# Patient Record
Sex: FEMALE | Race: WHITE | NOT HISPANIC OR LATINO | Employment: OTHER | URBAN - METROPOLITAN AREA
[De-identification: names, ages, dates, MRNs, and addresses within clinical notes are randomized per-mention and may not be internally consistent; named-entity substitution may affect disease eponyms.]

---

## 2017-04-28 ENCOUNTER — TELEPHONE (OUTPATIENT)
Dept: OBSTETRICS AND GYNECOLOGY | Age: 82
End: 2017-04-28

## 2017-04-28 NOTE — TELEPHONE ENCOUNTER
"Called pt, she notes symptoms are not vaginal but on external skin, she tried monistat without help. Recommend she make appt to evaluate. She reports \"little bumps\". Symptoms come and go for the last 2 years. Last visit was 2015. Recommend she come in for evaluation. Patient denies fever or severe issues, she will call back Monday to schedule.  "

## 2017-05-11 ENCOUNTER — OFFICE VISIT (OUTPATIENT)
Dept: OBSTETRICS AND GYNECOLOGY | Age: 82
End: 2017-05-11

## 2017-05-11 VITALS
HEIGHT: 69 IN | WEIGHT: 164 LBS | SYSTOLIC BLOOD PRESSURE: 122 MMHG | DIASTOLIC BLOOD PRESSURE: 74 MMHG | BODY MASS INDEX: 24.29 KG/M2

## 2017-05-11 DIAGNOSIS — B37.2 YEAST DERMATITIS: Primary | ICD-10-CM

## 2017-05-11 PROCEDURE — 99212 OFFICE O/P EST SF 10 MIN: CPT | Performed by: OBSTETRICS & GYNECOLOGY

## 2017-05-11 RX ORDER — LEVOTHYROXINE SODIUM 0.05 MG/1
50 TABLET ORAL DAILY
COMMUNITY
End: 2021-05-27

## 2017-05-11 RX ORDER — METOPROLOL TARTRATE 50 MG/1
50 TABLET, FILM COATED ORAL 2 TIMES DAILY
COMMUNITY
End: 2019-08-06

## 2017-05-11 RX ORDER — NYSTATIN 100000 U/G
CREAM TOPICAL 2 TIMES DAILY
Qty: 15 G | Refills: 1 | Status: SHIPPED | OUTPATIENT
Start: 2017-05-11 | End: 2021-05-27

## 2017-05-11 RX ORDER — NYSTATIN 100000 [USP'U]/G
POWDER TOPICAL 2 TIMES DAILY
Qty: 1 EACH | Refills: 1 | Status: SHIPPED | OUTPATIENT
Start: 2017-05-11 | End: 2017-05-11

## 2017-05-11 RX ORDER — LIOTHYRONINE SODIUM 5 UG/1
10 TABLET ORAL EVERY MORNING
COMMUNITY
End: 2021-12-02 | Stop reason: SDUPTHER

## 2019-08-06 ENCOUNTER — OFFICE VISIT (OUTPATIENT)
Dept: OBSTETRICS AND GYNECOLOGY | Age: 84
End: 2019-08-06

## 2019-08-06 VITALS
WEIGHT: 165.6 LBS | HEIGHT: 69 IN | BODY MASS INDEX: 24.53 KG/M2 | DIASTOLIC BLOOD PRESSURE: 72 MMHG | SYSTOLIC BLOOD PRESSURE: 110 MMHG

## 2019-08-06 DIAGNOSIS — Z01.419 ENCOUNTER FOR GYNECOLOGICAL EXAMINATION: Primary | ICD-10-CM

## 2019-08-06 PROCEDURE — G0101 CA SCREEN;PELVIC/BREAST EXAM: HCPCS | Performed by: OBSTETRICS & GYNECOLOGY

## 2019-08-06 RX ORDER — METOPROLOL SUCCINATE 50 MG/1
50 TABLET, EXTENDED RELEASE ORAL NIGHTLY
COMMUNITY
Start: 2019-04-22

## 2019-08-06 RX ORDER — AZELASTINE 1 MG/ML
1 SPRAY, METERED NASAL 2 TIMES DAILY
Refills: 5 | COMMUNITY
Start: 2019-05-21

## 2019-08-06 NOTE — PROGRESS NOTES
"Subjective     Chief Complaint   Patient presents with   • Gynecologic Exam     AE  HX MG 3/23/06       History of Present Illness    Madina Coughlin is a 84 y.o.  who presents for annual exam.  Pt denies vaginal bleeding or abnormal discharge; she is living at [place]    Obstetric History:  OB History      Para Term  AB Living    5 4 4   1 3    SAB TAB Ectopic Molar Multiple Live Births    1         3         Menstrual History:     No LMP recorded. Patient is postmenopausal.         Current contraception: post menopausal status  History of abnormal Pap smear: no  Received Gardasil immunization: no  Perform regular self breast exam: yes - reg  Family history of uterine or ovarian cancer: no  Family History of colon cancer: no  Family history of breast cancer: yes - Aunt after menopause    Mammogram: recommended  Colonoscopy: up to date.  DEXA: up to date, her endocrine manages; pt declines treatment    Exercise: moderately active  Calcium/Vitamin D: adequate intake    The following portions of the patient's history were reviewed and updated as appropriate: allergies, current medications, past family history, past medical history, past social history, past surgical history and problem list.    Review of Systems    Review of Systems   Constitutional: Negative for fatigue.   Respiratory: Negative for shortness of breath.    Gastrointestinal: Negative for abdominal pain except diet related/ pt has IBS. neg for blood in stool  Genitourinary: Negative for dysuria. neg for blood in stool  Neurological: Negative for headaches.   Psychiatric/Behavioral: Negative for dysphoric mood.         Objective   Physical Exam    /72   Ht 175.3 cm (69\")   Wt 75.1 kg (165 lb 9.6 oz)   Breastfeeding? No   BMI 24.45 kg/m²     General:   alert, appears stated age, cooperative and no distress   Neck: no asymmetry, masses, or scars and thyroid normal to palpation   Heart: regular rate and rhythm, S1, S2 " normal, no murmur, click, rub or gallop   Lungs: clear to auscultation bilaterally   Abdomen: soft, non-tender, without masses or organomegaly   Breast: inspection negative, no nipple discharge or bleeding, no masses or nodularity palpable and no axillary adenopathy   Vulva: Bartholin's, Urethra, [place]'s normal, atrophy noted with some irritation of skin at introitus with placement of speculum, no abnormal lesions noted   Vagina: normal discharge, atrophic without abnormal lesions   Cervix: no lesions   Uterus: normal size, mobile, non-tender, normal shape and consistency   Adnexa: normal adnexa and no mass, fullness, tenderness   Rectal: normal rectal, no masses     Assessment/Plan   Madina was seen today for gynecologic exam.    Diagnoses and all orders for this visit:    Encounter for gynecological examination        All questions answered.  Breast self exam technique reviewed and patient encouraged to perform self-exam monthly.  Discussed healthy lifestyle modifications.  Recommended 30 minutes of aerobic exercise five times per week.  Discussed calcium/ Vit D needs to prevent osteoporosis.  Pap deferred as pt > 64 yo and no hx of abnormal paps  Recommend mammogram. Pt currently declines    Pt has estrogen cream that her primary MD gave her. Discussed that this is appropriate to use sparingly on affected skin and pt reports she only applies focally and uses rarely.

## 2020-01-20 ENCOUNTER — HOSPITAL ENCOUNTER (EMERGENCY)
Facility: HOSPITAL | Age: 85
Discharge: HOME | End: 2020-01-21
Attending: EMERGENCY MEDICINE | Admitting: EMERGENCY MEDICINE

## 2020-01-20 DIAGNOSIS — T50.901A ACCIDENTAL MEDICATION OVERDOSE, INITIAL ENCOUNTER: Primary | ICD-10-CM

## 2020-01-20 PROCEDURE — 99282 EMERGENCY DEPT VISIT SF MDM: CPT

## 2020-01-21 VITALS
BODY MASS INDEX: 25.5 KG/M2 | DIASTOLIC BLOOD PRESSURE: 84 MMHG | HEART RATE: 50 BPM | OXYGEN SATURATION: 97 % | WEIGHT: 172.18 LBS | SYSTOLIC BLOOD PRESSURE: 152 MMHG | RESPIRATION RATE: 16 BRPM | TEMPERATURE: 97.4 F | HEIGHT: 69 IN

## 2020-01-21 NOTE — ED PROVIDER NOTES
EMERGENCY DEPARTMENT ENCOUNTER    CHIEF COMPLAINT  Chief Complaint: Drug Overdose   History given by: Patient   History limited by: none   Room Number: 16/16  PMD: Tara Russell MD      HPI:  Pt is a 84 y.o. female who presents complaining of accidental drug overdose prior to arrival at ED tonight. Per pt, at 2000 tonight she took her metoprolol dosage and states she forgot she had taken it so took a second dosage at 2100. Pt denies any symptoms including chest pain, SOA, palpitations, nausea, vomiting, diarrhea, or self injury intent. Per pt, she presented to [facility] and was told to present to ED for evaluation. Pt states she takes Metoprolol for hx of SVT.      Duration:  3 hours   Location: generalized   Quality: accidental drug overdose   Intensity/Severity: mild   Progression: unchanged   Associated Symptoms: none   Aggravating Factors: none   Alleviating Factors: none   Previous Episodes: none   Treatment before arrival: none     PAST MEDICAL HISTORY  Active Ambulatory Problems     Diagnosis Date Noted   • HTN (hypertension) 05/01/2013   • Hypothyroid 05/01/2013   • IBS (irritable bowel syndrome) 05/01/2013   • SVT (supraventricular tachycardia) (CMS/HCC) 05/01/2013     Resolved Ambulatory Problems     Diagnosis Date Noted   • No Resolved Ambulatory Problems     Past Medical History:   Diagnosis Date   • Abnormal heart rate    • Disease of thyroid gland        PAST SURGICAL HISTORY  History reviewed. No pertinent surgical history.    FAMILY HISTORY  Family History   Problem Relation Age of Onset   • Coronary artery disease Paternal Grandfather    • Coronary artery disease Maternal Grandfather    • Breast cancer Maternal Aunt    • Ovarian cancer Neg Hx    • Uterine cancer Neg Hx    • Colon cancer Neg Hx        SOCIAL HISTORY  Social History     Socioeconomic History   • Marital status:      Spouse name: Not on file   • Number of children: Not on file   • Years of education: Not on file   • Highest  education level: Not on file   Tobacco Use   • Smoking status: Never Smoker   • Smokeless tobacco: Never Used   Substance and Sexual Activity   • Sexual activity: Not Currently       ALLERGIES  Patient has no known allergies.    REVIEW OF SYSTEMS  Review of Systems   Respiratory: Negative for shortness of breath.    Cardiovascular: Negative for chest pain and palpitations.   Gastrointestinal: Negative for diarrhea, nausea and vomiting.   Psychiatric/Behavioral: Negative for self-injury.       PHYSICAL EXAM  ED Triage Vitals   Temp Heart Rate Resp BP SpO2   01/20/20 2252 01/20/20 2252 01/20/20 2252 01/20/20 2258 01/20/20 2252   97.4 °F (36.3 °C) 67 16 173/97 98 %      Temp src Heart Rate Source Patient Position BP Location FiO2 (%)   -- -- 01/20/20 2258 01/20/20 2258 --     Sitting Left arm        Physical Exam   Constitutional: She is oriented to person, place, and time. No distress.   Eyes: EOM are normal.   Neck: Normal range of motion.   Cardiovascular: Regular rhythm.   Mildly bradycardic.    Pulmonary/Chest: Effort normal and breath sounds normal. No respiratory distress.   Neurological: She is alert and oriented to person, place, and time. She has normal sensation and normal strength.   Skin: Skin is warm and dry.   Psychiatric: Affect normal.   Nursing note and vitals reviewed.    Procedures      PROGRESS AND CONSULTS     2359: Upon pt exam, discussed pt taking one extra dosage of medication as not being a concern at this time, especially due to event occurring 3 hours ago. Discussed pt's normal vitals upon arrival and plan for recheck in room with some monitoring prior to disposition. Pt understands and agrees with the plan, all questions answered.    0059: Pt's HR is 52 and BP is stable, pt will be discharged as prior discussed.     MEDICAL DECISION MAKING  Results were reviewed/discussed with the patient and they were also made aware of online access. Pt also made aware that some labs, such as cultures,  will not be resulted during ER visit and follow up with PMD is necessary.     MDM       DIAGNOSIS  Final diagnoses:   Accidental medication overdose, initial encounter       DISPOSITION  DISCHARGE    Patient discharged in stable condition.    Reviewed implications of results, diagnosis, meds, responsibility to follow up, warning signs and symptoms of possible worsening, potential complications and reasons to return to ER.    Patient/Family voiced understanding of above instructions.    Discussed plan for discharge, as there is no emergent indication for admission. Patient referred to primary care provider for BP management due to today's BP. Pt/family is agreeable and understands need for follow up and repeat testing.  Pt is aware that discharge does not mean that nothing is wrong but it indicates no emergency is present that requires admission and they must continue care with follow-up as given below or physician of their choice.     FOLLOW-UP  Tara Russell MD  [address] [place] [address]  [address]  [facility] [address] [phone]    Schedule an appointment as soon as possible for a visit            Medication List      No changes were made to your prescriptions during this visit.           Latest Documented Vital Signs:  As of 2:22 AM  BP- 152/84 HR- 50 Temp- 97.4 °F (36.3 °C) O2 sat- 97%    --  Documentation assistance provided by alondra Hall for Dr. Cordon.  Information recorded by the scribe was done at my direction and has been verified and validated by me.     Reva Hall  01/21/20 0126       Mateo Cordon MD  01/21/20 2444

## 2020-11-09 ENCOUNTER — TELEPHONE (OUTPATIENT)
Dept: OBSTETRICS AND GYNECOLOGY | Age: 85
End: 2020-11-09

## 2020-11-09 NOTE — TELEPHONE ENCOUNTER
Patient is experiencing redness and itching in her vulva area. Stated a dermatologist prescribed a fungal medication which helped a little but not clearing up.   Patient would like to know if she can come in and be seen or even do a televisit.  Please advise.

## 2020-11-10 ENCOUNTER — OFFICE VISIT (OUTPATIENT)
Dept: OBSTETRICS AND GYNECOLOGY | Age: 85
End: 2020-11-10

## 2020-11-10 VITALS
WEIGHT: 165.8 LBS | HEIGHT: 69 IN | BODY MASS INDEX: 24.56 KG/M2 | DIASTOLIC BLOOD PRESSURE: 60 MMHG | SYSTOLIC BLOOD PRESSURE: 100 MMHG

## 2020-11-10 DIAGNOSIS — N90.89 VULVAR IRRITATION: Primary | ICD-10-CM

## 2020-11-10 PROCEDURE — 99213 OFFICE O/P EST LOW 20 MIN: CPT | Performed by: OBSTETRICS & GYNECOLOGY

## 2020-11-10 NOTE — PROGRESS NOTES
"Chief complaint:groin/ vulvar irritation    HPI  Madina Coughlin is a 85 y.o. female presents for evaluation of groin/vulvar irritation. She reports she has had this off/on for several years. She had this evaluated by her dermatologist. He gave her a cream that helped with her groin irritation and itching but then she noted some redness  medially. She denies any vaginal bleeding or pelvic pain. She reports mild itching over area of erythema medially but notes most itching/irritation was in the groin area and improved.         The following portions of the patient's history were reviewed and updated as appropriate: allergies, current medications, past family history, past medical history, past social history, past surgical history and problem list.    Review of Systems  Constitutional: negative for fevers  Respiratory: negative for cough  Genitourinary:positive for groin and vulvar itching/redness, negative for vaginal bleeding  Integument/breast: positive for skin redness groin/vulva    /60   Ht 175.3 cm (69\")   Wt 75.2 kg (165 lb 12.8 oz)   LMP  (LMP Unknown)   Breastfeeding No   BMI 24.48 kg/m²         Physical Exam  Constitutional:       Appearance: Normal appearance.   Genitourinary:     Comments: Bilateral groin areas without rash/erythema or focal lesions. Mild symmetric erythema noted on both labia majora. No focal lesions or discharge noted. Remainder of vulva normal. Introitus and vagina are atrophic but appear normal.   Neurological:      General: No focal deficit present.      Mental Status: She is alert and oriented to person, place, and time.   Psychiatric:         Mood and Affect: Mood normal.         Behavior: Behavior normal.             Diagnoses and all orders for this visit:    1. Vulvar irritation (Primary)      Groin irritation was likely intertriginous yeast and is improved following antifungal/steroid cream. Pt notes less symptoms medially but she noted some erythema there. Site of " erythema corresponds to the area where her labia rest together in the midline. Recommend barrier cream and loose, cotton undergarments. Pt agrees with this plan and findings. Will f/u as needed or for annual next year.

## 2020-11-20 ENCOUNTER — TELEPHONE (OUTPATIENT)
Dept: OBSTETRICS AND GYNECOLOGY | Age: 85
End: 2020-11-20

## 2020-11-20 NOTE — TELEPHONE ENCOUNTER
(fyi only) Patient called, pt had seen you about two weeks ago and she wanted to thank you and let you know the problem has been solved.

## 2021-02-18 ENCOUNTER — OFFICE VISIT (OUTPATIENT)
Dept: ORTHOPEDIC SURGERY | Facility: CLINIC | Age: 86
End: 2021-02-18

## 2021-02-18 VITALS — BODY MASS INDEX: 24.25 KG/M2 | TEMPERATURE: 96 F | WEIGHT: 160 LBS | HEIGHT: 68 IN

## 2021-02-18 DIAGNOSIS — M17.12 PRIMARY OSTEOARTHRITIS OF LEFT KNEE: ICD-10-CM

## 2021-02-18 DIAGNOSIS — M25.562 LEFT KNEE PAIN, UNSPECIFIED CHRONICITY: Primary | ICD-10-CM

## 2021-02-18 PROCEDURE — 99203 OFFICE O/P NEW LOW 30 MIN: CPT | Performed by: NURSE PRACTITIONER

## 2021-02-18 PROCEDURE — 20610 DRAIN/INJ JOINT/BURSA W/O US: CPT | Performed by: NURSE PRACTITIONER

## 2021-02-18 PROCEDURE — 73562 X-RAY EXAM OF KNEE 3: CPT | Performed by: NURSE PRACTITIONER

## 2021-02-18 RX ORDER — DIPHENOXYLATE HYDROCHLORIDE AND ATROPINE SULFATE 2.5; .025 MG/1; MG/1
1 TABLET ORAL DAILY
COMMUNITY
End: 2022-10-25

## 2021-02-18 RX ORDER — METHYLPREDNISOLONE ACETATE 80 MG/ML
80 INJECTION, SUSPENSION INTRA-ARTICULAR; INTRALESIONAL; INTRAMUSCULAR; SOFT TISSUE
Status: COMPLETED | OUTPATIENT
Start: 2021-02-18 | End: 2021-02-18

## 2021-02-18 RX ADMIN — METHYLPREDNISOLONE ACETATE 80 MG: 80 INJECTION, SUSPENSION INTRA-ARTICULAR; INTRALESIONAL; INTRAMUSCULAR; SOFT TISSUE at 15:52

## 2021-02-18 NOTE — PROGRESS NOTES
"Patient Name: Madina Coughlin   YOB: 1935  Referring Primary Care Physician: Tara Russell MD  BMI: Body mass index is 24.33 kg/m².    Chief Complaint:    Chief Complaint   Patient presents with   • Left Knee - Pain        HPI: New pt to the clinic that presents with left knee pain and \"give way sensation\".  She resides at the [place] [facility] and has to walk up a flight of stairs to live on the second floor and has felt insecure doing this.  She started walking with a walker about 2 weeks ago and presents here for evaluation and treatment.  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  Takes Tylenol arthritis and very limited Advil past medical history is reviewed and she is very healthy for her age and active.    Madina Coughlin is a 85 y.o. female who presents today for evaluation of   Chief Complaint   Patient presents with   • Left Knee - Pain         Subjective   Medications:   Home Medications:  Current Outpatient Medications on File Prior to Visit   Medication Sig   • Cetirizine HCl 10 MG capsule Take  by mouth.   • Digestive Enzymes (DIGESTIVE ENZYME PO) Take  by mouth.   • levothyroxine (SYNTHROID, LEVOTHROID) 50 MCG tablet Take 50 mcg by mouth Daily.   • liothyronine (CYTOMEL) 5 MCG tablet Take 5 mcg by mouth Daily.   • metoprolol succinate XL (TOPROL-XL) 50 MG 24 hr tablet TAKE 1 TABLET EVERY DAY   • multivitamin (MULTI-VITAMIN PO) Take  by mouth Daily.   • Probiotic Product (PROBIOTIC PO) Take  by mouth.   • azelastine (ASTELIN) 0.1 % nasal spray USE 1 TO 2 PUFFS IN EACH NOSTRIL AT BEDTIME   • nystatin (MYCOSTATIN) 174198 UNIT/GM cream Apply  topically 2 (Two) Times a Day. to affected area as needed     No current facility-administered medications on file prior to visit.      Current Medications:  Scheduled Meds:  Continuous Infusions:No current facility-administered medications for this visit.     PRN " Meds:.    I have reviewed the patient's medical history in detail and updated the computerized patient record.  Review and summarization of old records includes:    Past Medical History:   Diagnosis Date   • Abnormal heart rate    • Disease of thyroid gland       History reviewed. No pertinent surgical history.     Social History     Occupational History   • Not on file   Tobacco Use   • Smoking status: Never Smoker   • Smokeless tobacco: Never Used   Substance and Sexual Activity   • Alcohol use: Not on file   • Drug use: Not on file   • Sexual activity: Not Currently      Social History     Social History Narrative   • Not on file        Family History   Problem Relation Age of Onset   • Coronary artery disease Paternal Grandfather    • Coronary artery disease Maternal Grandfather    • Breast cancer Maternal Aunt    • Lymphoma Brother    • Ovarian cancer Neg Hx    • Uterine cancer Neg Hx    • Colon cancer Neg Hx        ROS: 14 point review of systems was performed and all other systems were reviewed and are negative except for documented findings in HPI and today's encounter.     Allergies: No Known Allergies  Constitutional:  Denies fever, shaking or chills   Eyes:  Denies change in visual acuity   HENT:  Denies nasal congestion or sore throat   Respiratory:  Denies cough or shortness of breath   Cardiovascular:  Denies chest pain or severe LE edema   GI:  Denies abdominal pain, nausea, vomiting, bloody stools or diarrhea   Musculoskeletal:  Numbness, tingling, pain, or loss of motor function only as noted above in history of present illness.  : Denies painful urination or hematuria  Integument:  Denies rash, lesion or ulceration   Neurologic:  Denies headache or focal weakness  Endocrine:  Denies lymphadenopathy  Psych:  Denies confusion or change in mental status   Hem:  Denies active bleeding    OBJECTIVE:  Physical Exam: 85 y.o. female  Wt Readings from Last 3 Encounters:   02/18/21 72.6 kg (160 lb)  "  11/10/20 75.2 kg (165 lb 12.8 oz)   01/21/20 78.1 kg (172 lb 2.9 oz)     Ht Readings from Last 1 Encounters:   02/18/21 172.7 cm (68\")     Body mass index is 24.33 kg/m².  Vitals:    02/18/21 1522   Temp: 96 °F (35.6 °C)     Vital signs reviewed.     General Appearance:    Alert, cooperative, in no acute distress                  Eyes: conjunctiva clear  ENT: external ears and nose atraumatic  CV: no peripheral edema  Resp: normal respiratory effort  Skin: no rashes or wounds; normal turgor  Psych: mood and affect appropriate  Lymph: no nodes appreciated  Neuro: gross sensation intact  Vascular:  Palpable peripheral pulse in noted extremity  Musculoskeletal Extremities: Skin is warm dry and intact with good pulses movement and sensation calves are soft and nontender bilaterally she has medial joint line tenderness with effusion and synovitis and patellofemoral crepitation she is ambulating with a walker hip is nontender ankles nontender she has stiffness upon ambulation    Radiology:   3 views left knee were done today with no comparison views for pain that show tricompartmental osteoarthritis worse with valgus deformity on the left    Assessment:     ICD-10-CM ICD-9-CM   1. Left knee pain, unspecified chronicity  M25.562 719.46   2. Primary osteoarthritis of left knee  M17.12 715.16        Large Joint Arthrocentesis: L knee  Date/Time: 2/18/2021 3:52 PM  Consent given by: patient  Site marked: site marked  Timeout: Immediately prior to procedure a time out was called to verify the correct patient, procedure, equipment, support staff and site/side marked as required   Supporting Documentation  Indications: pain and joint swelling   Procedure Details  Location: knee - L knee  Preparation: Patient was prepped and draped in the usual sterile fashion  Needle gauge: 21 G.  Approach: anterolateral  Medications administered: 2 mL lidocaine (cardiac); 80 mg methylPREDNISolone acetate 80 MG/ML  Patient tolerance: patient " tolerated the procedure well with no immediate complications             MDM/Plan:   The diagnosis(es), natural history, pathophysiology and treatment for diagnosis(es) were discussed. Opportunity given and questions answered.  Biomechanics of pertinent body areas discussed.  When appropriate, the use of ambulatory aids discussed.    The diagnosis(es), natural history, pathophysiology and treatment for diagnosis(es) were discussed. Opportunity given and questions answered.  Biomechanics of pertinent body areas discussed.  When appropriate, the use of ambulatory aids discussed.  BMI:  The concept of BMI body mass index and its importance and implications discussed.    EXERCISES:  Advice on benefits of, and types of regular/moderate exercise pertaining to orthopedic diagnosis(es).  MEDICATIONS:  The risks, benefits, warnings,side effects and alternatives of medications discussed.  Inflammation/pain control; with cold, heat, elevation and/or liniments discussed as appropriate  Cortisone Injection. See procedure note.  PT referral.  HOME EXERCISE/PT program encouraged  MEDICAL RECORDS reviewed from other provider(s) for past and current medical history pertinent to this complaint.        Daniela Rojas MA  2/18/2021  15:26 EST     2/18/2021    Much of this encounter note is an electronic transcription/translation of spoken language to printed text. The electronic translation of spoken language may permit erroneous, or at times, nonsensical words or phrases to be inadvertently transcribed; Although I have reviewed the note for such errors, some may still exist

## 2021-02-18 NOTE — PATIENT INSTRUCTIONS
"Osteoarthritis    Osteoarthritis is a type of arthritis that affects tissue that covers the ends of bones in joints (cartilage). Cartilage acts as a cushion between the bones and helps them move smoothly. Osteoarthritis results when cartilage in the joints gets worn down. Osteoarthritis is sometimes called \"wear and tear\" arthritis.  Osteoarthritis is the most common form of arthritis. It often occurs in older people. It is a condition that gets worse over time (a progressive condition). Joints that are most often affected by this condition are in:  · Fingers.  · Toes.  · Hips.  · Knees.  · Spine, including neck and lower back.  What are the causes?  This condition is caused by age-related wearing down of cartilage that covers the ends of bones.  What increases the risk?  The following factors may make you more likely to develop this condition:  · Older age.  · Being overweight or obese.  · Overuse of joints, such as in athletes.  · Past injury of a joint.  · Past surgery on a joint.  · Family history of osteoarthritis.  What are the signs or symptoms?  The main symptoms of this condition are pain, swelling, and stiffness in the joint. The joint may lose its shape over time. Small pieces of bone or cartilage may break off and float inside of the joint, which may cause more pain and damage to the joint. Small deposits of bone (osteophytes) may grow on the edges of the joint. Other symptoms may include:  · A grating or scraping feeling inside the joint when you move it.  · Popping or creaking sounds when you move.  Symptoms may affect one or more joints. Osteoarthritis in a major joint, such as your knee or hip, can make it painful to walk or exercise. If you have osteoarthritis in your hands, you might not be able to  items, twist your hand, or control small movements of your hands and fingers (fine motor skills).  How is this diagnosed?  This condition may be diagnosed based on:  · Your medical history.  · A " physical exam.  · Your symptoms.  · X-rays of the affected joint(s).  · Blood tests to rule out other types of arthritis.  How is this treated?  There is no cure for this condition, but treatment can help to control pain and improve joint function. Treatment plans may include:  · A prescribed exercise program that allows for rest and joint relief. You may work with a physical therapist.  · A weight control plan.  · Pain relief techniques, such as:  ? Applying heat and cold to the joint.  ? Electric pulses delivered to nerve endings under the skin (transcutaneous electrical nerve stimulation, or TENS).  ? Massage.  ? Certain nutritional supplements.  · NSAIDs or prescription medicines to help relieve pain.  · Medicine to help relieve pain and inflammation (corticosteroids). This can be given by mouth (orally) or as an injection.  · Assistive devices, such as a brace, wrap, splint, specialized glove, or cane.  · Surgery, such as:  ? An osteotomy. This is done to reposition the bones and relieve pain or to remove loose pieces of bone and cartilage.  ? Joint replacement surgery. You may need this surgery if you have very bad (advanced) osteoarthritis.  Follow these instructions at home:  Activity  · Rest your affected joints as directed by your health care provider.  · Do not drive or use heavy machinery while taking prescription pain medicine.  · Exercise as directed. Your health care provider or physical therapist may recommend specific types of exercise, such as:  ? Strengthening exercises. These are done to strengthen the muscles that support joints that are affected by arthritis. They can be performed with weights or with exercise bands to add resistance.  ? Aerobic activities. These are exercises, such as brisk walking or water aerobics, that get your heart pumping.  ? Range-of-motion activities. These keep your joints easy to move.  ? Balance and agility exercises.  Managing pain, stiffness, and swelling          · If directed, apply heat to the affected area as often as told by your health care provider. Use the heat source that your health care provider recommends, such as a moist heat pack or a heating pad.  ? If you have a removable assistive device, remove it as told by your health care provider.  ? Place a towel between your skin and the heat source. If your health care provider tells you to keep the assistive device on while you apply heat, place a towel between the assistive device and the heat source.  ? Leave the heat on for 20-30 minutes.  ? Remove the heat if your skin turns bright red. This is especially important if you are unable to feel pain, heat, or cold. You may have a greater risk of getting burned.  · If directed, put ice on the affected joint:  ? If you have a removable assistive device, remove it as told by your health care provider.  ? Put ice in a plastic bag.  ? Place a towel between your skin and the bag. If your health care provider tells you to keep the assistive device on during icing, place a towel between the assistive device and the bag.  ? Leave the ice on for 20 minutes, 2-3 times a day.  General instructions  · Take over-the-counter and prescription medicines only as told by your health care provider.  · Maintain a healthy weight. Follow instructions from your health care provider for weight control. These may include dietary restrictions.  · Do not use any products that contain nicotine or tobacco, such as cigarettes and e-cigarettes. These can delay bone healing. If you need help quitting, ask your health care provider.  · Use assistive devices as directed by your health care provider.  · Keep all follow-up visits as told by your health care provider. This is important.  Where to find more information  · [employer] [place] of Arthritis and Musculoskeletal and Skin Diseases: www.niams.nih.gov  · [place] on Aging: www.kobi.nih.gov  · American College of Rheumatology:  www.rheumatology.org  Contact a health care provider if:  · Your skin turns red.  · You develop a rash.  · You have pain that gets worse.  · You have a fever along with joint or muscle aches.  Get help right away if:  · You lose a lot of weight.  · You suddenly lose your appetite.  · You have night sweats.  Summary  · Osteoarthritis is a type of arthritis that affects tissue covering the ends of bones in joints (cartilage).  · This condition is caused by age-related wearing down of cartilage that covers the ends of bones.  · The main symptom of this condition is pain, swelling, and stiffness in the joint.  · There is no cure for this condition, but treatment can help to control pain and improve joint function.  This information is not intended to replace advice given to you by your health care provider. Make sure you discuss any questions you have with your health care provider.  Document Revised: 11/30/2018 Document Reviewed: 08/21/2017  [employer] Patient Education © 2020 [employer]  Knee Injection  A knee injection is a procedure to get medicine into your knee joint to relieve the pain, swelling, and stiffness of arthritis. Your health care provider uses a needle to inject medicine, which may also help to lubricate and cushion your knee joint. You may need more than one injection.  Tell a health care provider about:  · Any allergies you have.  · All medicines you are taking, including vitamins, herbs, eye drops, creams, and over-the-counter medicines.  · Any problems you or family members have had with anesthetic medicines.  · Any blood disorders you have.  · Any surgeries you have had.  · Any medical conditions you have.  · Whether you are pregnant or may be pregnant.  What are the risks?  Generally, this is a safe procedure. However, problems may occur, including:  · Infection.  · Bleeding.  · Symptoms that get worse.  · Damage to the area around your knee.  · Allergic reaction to any of the medicines.  · Skin  reactions from repeated injections.  What happens before the procedure?  · Ask your health care provider about changing or stopping your regular medicines. This is especially important if you are taking diabetes medicines or blood thinners.  · Plan to have someone take you home from the hospital or clinic.  What happens during the procedure?    · You will sit or lie down in a position for your knee to be treated.  · The skin over your kneecap will be cleaned with a germ-killing soap.  · You will be given a medicine that numbs the area (local anesthetic). You may feel some stinging.  · The medicine will be injected into your knee. The needle is carefully placed between your kneecap and your knee. The medicine is injected into the joint space.  · The needle will be removed at the end of the procedure.  · A bandage (dressing) may be placed over the injection site.  The procedure may vary among health care providers and hospitals.  What can I expect after the procedure?  · Your blood pressure, heart rate, breathing rate, and blood oxygen level will be monitored until you leave the hospital or clinic.  · You may have to move your knee through its full range of motion. This helps to get all the medicine into your joint space.  · You will be watched to make sure that you do not have a reaction to the injected medicine.  · You may feel more pain, swelling, and warmth than you did before the injection. This reaction may last about 1-2 days.  Follow these instructions at home:  Medicines  · Take over-the-counter and prescription medicines only as told by your doctor.  · Do not drive or use heavy machinery while taking prescription pain medicine.  · Do not take medicines such as aspirin and ibuprofen unless your health care provider tells you to take them.  Injection site care  · Follow instructions from your health care provider about:  ? How to take care of your puncture site.  ? When and how you should change your  dressing.  ? When you should remove your dressing.  · Check your injection area every day for signs of infection. Check for:  ? More redness, swelling, or pain after 2 days.  ? Fluid or blood.  ? Pus or a bad smell.  ? Warmth.  Managing pain, stiffness, and swelling    · If directed, put ice on the injection area:  ? Put ice in a plastic bag.  ? Place a towel between your skin and the bag.  ? Leave the ice on for 20 minutes, 2-3 times per day.  · Do not apply heat to your knee.  · Raise (elevate) the injection area above the level of your heart while you are sitting or lying down.  General instructions  · If you were given a dressing, keep it dry until your health care provider says it can be removed. Ask your health care provider when you can start showering or taking a bath.  · Avoid strenuous activities for as long as directed by your health care provider. Ask your health care provider when you can return to your normal activities.  · Keep all follow-up visits as told by your health care provider. This is important. You may need more injections.  Contact a health care provider if you have:  · A fever.  · Warmth in your injection area.  · Fluid, blood, or pus coming from your injection site.  · Symptoms at your injection site that last longer than 2 days after your procedure.  Get help right away if:  · Your knee:  ? Turns very red.  ? Becomes very swollen.  ? Is in severe pain.  Summary  · A knee injection is a procedure to get medicine into your knee joint to relieve the pain, swelling, and stiffness of arthritis.  · A needle is carefully placed between your kneecap and your knee to inject medicine into the joint space.  · Before the procedure, ask your health care provider about changing or stopping your regular medicines, especially if you are taking diabetes medicines or blood thinners.  · Contact your health care provider if you have any problems or questions after your procedure.  This information is not  intended to replace advice given to you by your health care provider. Make sure you discuss any questions you have with your health care provider.  Document Revised: 01/07/2019 Document Reviewed: 01/07/2019  [employer] Patient Education © 2020 [employer]

## 2021-02-23 ENCOUNTER — TELEPHONE (OUTPATIENT)
Dept: ORTHOPEDIC SURGERY | Facility: CLINIC | Age: 86
End: 2021-02-23

## 2021-02-23 NOTE — TELEPHONE ENCOUNTER
----- Message from Madina Coughlin sent at 2/23/2021 11:31 AM EST -----  Regarding: Non-Urgent Medical Question  Contact: [phone]  Curious as to how bad my other knee is.  I am worrying that it will go out also and then what happens? And should I try to walk as little as possible or doesn't it matter.  Thanks!

## 2021-02-23 NOTE — TELEPHONE ENCOUNTER
Caller: AMADOR ROLLINS     Relationship: SELF    Best call back number: [phone]    What orders are you requesting (i.e. lab or imaging): ORDERS TO PHYSICAL THERAPY     In what timeframe would the patient need to come in: ASAP    Where will you receive your lab/imaging services: WHERE PROVIDER RECOMMENDS WITH [facility]    Additional notes:PATIENT ORIGINALLY DENIED PHYSICAL THERAPY APPOINTMENT TO GO TO THE [facility] BUT THEY ARE NOT DOING PT RIGHT NOW

## 2021-03-02 DIAGNOSIS — Z23 IMMUNIZATION DUE: ICD-10-CM

## 2021-03-09 ENCOUNTER — HOSPITAL ENCOUNTER (OUTPATIENT)
Dept: PHYSICAL THERAPY | Facility: HOSPITAL | Age: 86
Setting detail: THERAPIES SERIES
Discharge: HOME | End: 2021-03-09

## 2021-03-09 DIAGNOSIS — R26.9 GAIT DIFFICULTY: ICD-10-CM

## 2021-03-09 DIAGNOSIS — M25.60 RANGE OF MOTION DEFICIT: ICD-10-CM

## 2021-03-09 DIAGNOSIS — M25.562 CHRONIC PAIN OF LEFT KNEE: Primary | ICD-10-CM

## 2021-03-09 DIAGNOSIS — R29.898 WEAKNESS OF LEFT LOWER EXTREMITY: ICD-10-CM

## 2021-03-09 DIAGNOSIS — G89.29 CHRONIC PAIN OF LEFT KNEE: Primary | ICD-10-CM

## 2021-03-09 PROCEDURE — 97530 THERAPEUTIC ACTIVITIES: CPT

## 2021-03-09 PROCEDURE — 97110 THERAPEUTIC EXERCISES: CPT

## 2021-03-09 PROCEDURE — 97161 PT EVAL LOW COMPLEX 20 MIN: CPT

## 2021-03-09 NOTE — THERAPY EVALUATION
Outpatient Physical Therapy Ortho Initial Evaluation  [facility]     Patient Name: Madina Coughlin  : 1935  MRN: [id number]  Today's Date: 3/9/2021      Visit Date: 2021    Patient Active Problem List   Diagnosis   • HTN (hypertension)   • Hypothyroid   • IBS (irritable bowel syndrome)   • SVT (supraventricular tachycardia) (CMS/HCC)        Past Medical History:   Diagnosis Date   • Abnormal heart rate    • Disease of thyroid gland         No past surgical history on file.    Visit Dx:     ICD-10-CM ICD-9-CM   1. Chronic pain of left knee  M25.562 719.46    G89.29 338.29   2. Weakness of left lower extremity  R29.898 729.89   3. Gait difficulty  R26.9 781.2   4. Range of motion deficit  M25.60 719.50         Patient History     Row Name 21 1100 21 2304          History    Chief Complaint  Pain;Other 1 (comment) knee gave out  -JA  --     Type of Pain  Knee pain L  -JA  Knee pain  (Pended)   (Patient-Rptd)   -patient     Brief Description of Current Complaint  bone on bone arthritis on left knee; got up from a chair 3 weeks ago and had severe pain.  Had been wlking daily althouthgh the distance was decreasing due to pain increasing.  Lives on second floor and uses stairs but has elevator.  -JA  bone on bone arthritis on left knee  (Pended)   (Patient-Rptd)   -patient     Patient/Caregiver Goals  --  Relieve pain;Return to prior level of function;Improve mobility;Improve strength  (Pended)   (Patient-Rptd)   -patient     Hand Dominance  --  left-handed  (Pended)   (Patient-Rptd)   -patient     Occupation/sports/leisure activities  --  walking  (Pended)   (Patient-Rptd)   -patient     What clinical tests have you had for this problem?  --  X-ray  (Pended)   (Patient-Rptd)   -patient     Are you or can you be pregnant  --  No  (Pended)   (Patient-Rptd)   -patient        Pain     Pain Location  Knee  -JA  --     Pain at Present  3  -JA  --     What Performance Factors Make the Current  Problem(s) WORSE?  walking, sit to stand  -JA  --        Fall Risk Assessment    Any falls in the past year:  No  -JA  No  (Pended)   (Patient-Rptd)   -patient        Services    Prior Rehab/Home Health Experiences  --  No  (Pended)   (Patient-Rptd)   -patient     Are you currently receiving Home Health services  --  No  (Pended)   (Patient-Rptd)   -patient     Do you plan to receive Home Health services in the near future  --  No  (Pended)   (Patient-Rptd)   -patient        Daily Activities    Primary Language  --  English  (Pended)   (Patient-Rptd)   -patient     Are you able to read  --  Yes  (Pended)   (Patient-Rptd)   -patient     Are you able to write  --  Yes  (Pended)   (Patient-Rptd)   -patient     How does patient learn best?  --  Listening;Reading;Demonstration;Pictures/Video  (Pended)   (Patient-Rptd)   -patient        Safety    Are you being hurt, hit, or frightened by anyone at home or in your life?  --  No  (Pended)   (Patient-Rptd)   -patient     Are you being neglected by a caregiver  --  No  (Pended)   (Patient-Rptd)   -patient       User Key  (r) = Recorded By, (t) = Taken By, (c) = Cosigned By    Initials Name Provider Type    Isela Coates, PT Physical Therapist    patient Madina Coughlin --          PT Ortho     Row Name 03/09/21 1200       Posture/Observations    Alignment Options  Forward head;Cervical lordosis;Thoracic kyphosis;Rounded shoulders;Scapular elevation;Scapular winging;Scoliosis;Lumbar lordosis;Iliac crests;Genu valgus;Genu varus  -JA    Forward Head  Mild;Moderate  -JA    Thoracic Kyphosis  Increased  -JA    Rounded Shoulders  Increased  -JA    Lumbar lordosis  Increased compensating for forward flexed from hips  -JA    Genu valgus  Left:;Increased  -JA    Posture/Observations Comments  mild forward flex posture in standing/walking  -JA       General ROM    RT Lower Ext  Comment  -JA    LT Lower Ext  Lt Knee Extension/Flexion  -JA       Right Lower Ext    RT Lower  Extremity Comments  knee AROM   -JA       Left Lower Ext    Lt Knee Extension/Flexion AROM    -JA       MMT (Manual Muscle Testing)    Rt Lower Ext  Rt Hip ABduction;Rt Hip ADduction;Rt Knee Extension;Rt Knee Flexion;Rt Hip Flexion;Rt Ankle Plantarflexion;Rt Ankle Dorsiflexion  -JA    Lt Lower Ext  Lt Hip Flexion;Lt Hip ABduction;Lt Hip ADduction;Lt Ankle Dorsiflexion;Lt Ankle Plantarflexion;Lt Knee Extension;Lt Knee Flexion  -JA       MMT Right Lower Ext    Rt Hip Flexion MMT, Gross Movement  (3/5) fair  -JA    Rt Hip ABduction MMT, Gross Movement  (3/5) fair  -JA    Rt Hip ADduction MMT, Gross Movement  (3/5) fair  -JA    Rt Knee Extension MMT, Gross Movement  (3/5) fair  -JA    Rt Knee Flexion MMT, Gross Movement  (3+/5) fair plus  -JA    Rt Ankle Plantarflexion MMT, Gross Movement  (3+/5) fair plus  -JA    Rt Ankle Dorsiflexion MMT, Gross Movement  (3/5) fair  -JA    Rt Lower Extremity Comments   non-painful knee  -JA       MMT Left Lower Ext    Lt Hip Flexion MMT, Gross Movement  (3+/5) fair plus  -JA    Lt Hip ABduction MMT, Gross Movement  (3+/5) fair plus  -JA    Lt Hip ADduction MMT, Gross Movement  (3/5) fair  -JA    Lt Knee Extension MMT, Gross Movement  (3/5) fair  -JA    Lt Knee Flexion MMT, Gross Movement  (3/5) fair  -JA    Lt Ankle Plantarflexion MMT, Gross Movement  (3+/5) fair plus  -JA    Lt Ankle Dorsiflexion MMT, Gross Movement  (3+/5) fair plus  -JA    Lt Lower Extremity Comments   painful knee  -JA       Transfers    Comment (Transfers)  uses UE assist  -JA       Gait/Stairs (Locomotion)    Left Sided Gait Deviations  heel strike decreased;weight shift ability decreased;forward flexed posture  -SELENA    Comment (Gait/Stairs)  uses STC today, borrowed a RWX when pain was worse  -JA      User Key  (r) = Recorded By, (t) = Taken By, (c) = Cosigned By    Initials Name Provider Type    Isela Coates, PT Physical Therapist                      Therapy Education  Education Details:  "Access Code: [email or web address]  Discussed importance of quad strength to knee function and mobility.  She has poor quad activation/control.  REquired copious cuing and we had to restrict focus primarily to quad activation for the first week, will progress next visit as able.  REcommended riding rec bike in workout room where she lives a few days a week in place of walking to reduce strain on knee.    Given: HEP, Symptoms/condition management, Pain management, Posture/body mechanics  Program: New  How Provided: Verbal, Demonstration, Written  Provided to: Patient  Level of Understanding: Teach back education performed, Verbalized, Demonstrated     PT OP Goals     Row Name 03/09/21 1300          PT Short Term Goals    STG Date to Achieve  04/09/21  -SELENA     STG 1  Pt will be independent with initial HEP.  -SELENA     STG 1 Progress  New  -SELENA     STG 2  Pt will demonstrate good quad contraction without recruitment of gluteals, hamstrings, or gastroc.  -SELENA     STG 2 Progress  New  -SELENA     STG 3  Pt will demonstrate heel strike to toe off gait with assistive device.  -SELENA     STG 3 Progress  New  -SELENA        Long Term Goals    LTG Date to Achieve  05/09/21  -SELENA     LTG 1  Pt will be independent with knee \"Prehab\" program.  -SELENA     LTG 1 Progress  New  -SELENA     LTG 2  Pt will demonstrate increased AROM by 5 degrees (from 15 degrees active extension initially).  -SELENA     LTG 2 Progress  New  -SELENA     LTG 3  Pt will score 49% or better in KOS indicating decrease in perceived functional disability.  -SELENA     LTG 3 Progress  New  -SELENA        Time Calculation    PT Goal Re-Cert Due Date  06/09/21  -SELENA       User Key  (r) = Recorded By, (t) = Taken By, (c) = Cosigned By    Initials Name Provider Type    Isela Coates, PT Physical Therapist          PT Assessment/Plan     Row Name 03/09/21 1300          PT Assessment    Functional Limitations  Decreased safety during functional activities;Impaired gait;Limitation in home " management;Limitations in community activities;Limitations in functional capacity and performance;Performance in leisure activities;Performance in self-care ADL  -     Impairments  Pain;Muscle strength;Gait;Endurance;Balance;Range of motion;Posture  -     Assessment Comments  Madina Coughlin is a 85 y.o. female referred to outpatient physical therapy for evaluation and treatment of left knee pain secondary to severe OA and recent onset of pain.  Patient presents with significant weakness in laura LE's, gait deviations and pain-limited ambulation that now requires an assistive device, decreased ROM, difficulty with transitional movements. Pt is considering TKA.  Signs and symptoms are consistent with referring diagnosis.  Pertinent comorbidities and personal factors that may affect progress include, but are not limited to, degenerative changes.  This condition is evolving. Recommend skilled PT to address functional deficits. Thank you for this referral.  -SELENA     Please refer to paper survey for additional self-reported information  Yes  -JA     Rehab Potential  Good  -JA     Patient/caregiver participated in establishment of treatment plan and goals  Yes  -SELENA     Patient would benefit from skilled therapy intervention  Yes  -SELENA        PT Plan    PT Frequency  1x/week  -SELENA     Predicted Duration of Therapy Intervention (PT)  4-8 visits  -SEELNA     Planned CPT's?  PT EVAL LOW COMPLEXITY: 85419;PT RE-EVAL: 48579;PT THER PROC EA 15 MIN: 84462;PT THER ACT EA 15 MIN: 74653;PT MANUAL THERAPY EA 15 MIN: 64674;PT NEUROMUSC RE-EDUCATION EA 15 MIN: 73399;PT GAIT TRAINING EA 15 MIN: 26003;PT SELF CARE/HOME MGMT/TRAIN EA 15: 70184;PT HOT OR COLD PACK TREAT MCARE  -SELENA     PT Plan Comments  review HEP, progress prehab ex's add ham stretch  -SELENA       User Key  (r) = Recorded By, (t) = Taken By, (c) = Cosigned By    Initials Name Provider Type    Isela Coates, PT Physical Therapist            OP Exercises     Row Name 03/09/21  1200             Subjective Comments    Subjective Comments  initial eval  -JA         Total Minutes    48790 - PT Therapeutic Exercise Minutes  13  -JA      37134 - PT Therapeutic Activity Minutes  10  -JA         Exercise 1    Exercise Name 1  Discussed importance of quad muscle, OA in knee jt, walking v. other cardio options.  -JA      Time 1  5 min  -JA      Additional Comments  L Knee pain  -JA         Exercise 2    Exercise Name 2  Quad set w/towel roll  -JA      Cueing 2  Verbal;Tactile  -JA      Time 2  10 min  -JA      Additional Comments  copious cuing, required continuous monitoring and feedback--difficulty achieiveing  -JA         Exercise 3    Exercise Name 3  SAQ  -JA      Cueing 3  Verbal;Tactile  -JA      Reps 3  10  -JA      Time 3  3sec  -JA         Exercise 4    Exercise Name 4  Glute sets  -JA      Cueing 4  Verbal;Tactile  -JA      Reps 4  10  -JA      Time 4  3sec  -JA         Exercise 5    Exercise Name 5  LAQ  -JA      Cueing 5  Verbal;Tactile;Demo  -JA      Reps 5  10  -JA      Time 5  3sec  -JA         Exercise 6    Exercise Name 6  Sit to stand strategy, practice to reduce UE assist.  -JA      Time 6  5min  -JA        User Key  (r) = Recorded By, (t) = Taken By, (c) = Cosigned By    Initials Name Provider Type    Isela Coates, PT Physical Therapist                        Outcome Measure Options: Knee Outcome Score- ADL  Knee Outcome Score  Knee Outcome Score Comments: 39%      Time Calculation:     Start Time: 1145  Stop Time: 1230  Time Calculation (min): 45 min     Therapy Charges for Today     Code Description Service Date Service Provider Modifiers Qty    22121651427  PT THER PROC EA 15 MIN 3/9/2021 Isela Roper, PT GP 1    18059207626  PT THERAPEUTIC ACT EA 15 MIN 3/9/2021 Isela Roper, PT GP 1    [id number]  PT EVAL LOW COMPLEXITY 1 3/9/2021 Isela Roper, PT GP 1          PT G-Codes  Outcome Measure Options: Knee Outcome Score- ADL          Isela Roper, PT  3/9/2021

## 2021-03-10 ENCOUNTER — APPOINTMENT (OUTPATIENT)
Dept: VACCINE CLINIC | Facility: HOSPITAL | Age: 86
End: 2021-03-10

## 2021-03-18 ENCOUNTER — HOSPITAL ENCOUNTER (OUTPATIENT)
Dept: PHYSICAL THERAPY | Facility: HOSPITAL | Age: 86
Setting detail: THERAPIES SERIES
Discharge: HOME | End: 2021-03-18

## 2021-03-18 DIAGNOSIS — G89.29 CHRONIC PAIN OF LEFT KNEE: Primary | ICD-10-CM

## 2021-03-18 DIAGNOSIS — M25.60 RANGE OF MOTION DEFICIT: ICD-10-CM

## 2021-03-18 DIAGNOSIS — M25.562 CHRONIC PAIN OF LEFT KNEE: Primary | ICD-10-CM

## 2021-03-18 DIAGNOSIS — R26.9 GAIT DIFFICULTY: ICD-10-CM

## 2021-03-18 DIAGNOSIS — R29.898 WEAKNESS OF LEFT LOWER EXTREMITY: ICD-10-CM

## 2021-03-18 PROCEDURE — 97110 THERAPEUTIC EXERCISES: CPT

## 2021-03-18 NOTE — THERAPY TREATMENT NOTE
Outpatient Physical Therapy Ortho Treatment Note  [facility]     Patient Name: Madina Coughlin  : 1935  MRN: [id number]  Today's Date: 3/18/2021      Visit Date: 2021    Visit Dx:    ICD-10-CM ICD-9-CM   1. Chronic pain of left knee  M25.562 719.46    G89.29 338.29   2. Weakness of left lower extremity  R29.898 729.89   3. Gait difficulty  R26.9 781.2   4. Range of motion deficit  M25.60 719.50       Patient Active Problem List   Diagnosis   • HTN (hypertension)   • Hypothyroid   • IBS (irritable bowel syndrome)   • SVT (supraventricular tachycardia) (CMS/HCC)        Past Medical History:   Diagnosis Date   • Abnormal heart rate    • Disease of thyroid gland         No past surgical history on file.                    PT Assessment/Plan     Row Name 21 1502          PT Assessment    Assessment Comments  Patient ambulating with SC. Possible avoid left TKE. Continue 1x a week 3 weeks  -WS       User Key  (r) = Recorded By, (t) = Taken By, (c) = Cosigned By    Initials Name Provider Type    WS Desmond Ventura PTA Physical Therapy Assistant            OP Exercises     Row Name 21 1430             Subjective Comments    Subjective Comments  no left knee pain, do really maya the cane now  -WS         Subjective Pain    Able to rate subjective pain?  yes  -WS      Pre-Treatment Pain Level  0  -WS         Total Minutes    90755 - PT Therapeutic Exercise Minutes  30  -WS         Exercise 1    Exercise Name 1  nustep LE L4  -WS      Time 1  5 min  -WS         Exercise 2    Exercise Name 2  Quad set w/towel roll  -WS      Cueing 2  Verbal;Tactile  -WS      Time 2  15  -WS      Additional Comments  laura  -WS         Exercise 3    Exercise Name 3  SAQ  -WS      Cueing 3  Verbal;Tactile  -WS      Reps 3  15  -WS      Time 3  3sec  -WS      Additional Comments  laura  -WS         Exercise 4    Exercise Name 4  Glute sets  -WS      Cueing 4  Verbal;Tactile  -WS      Reps 4  15  -WS      Time 4  3sec   "-WS         Exercise 5    Exercise Name 5  LAQ  -WS      Cueing 5  Verbal;Tactile;Demo  -WS      Reps 5  15  -WS      Time 5  3sec  -WS      Additional Comments  laura  -WS         Exercise 6    Exercise Name 6  Sit to stand strategy, practice to reduce UE assist.  -WS      Time 6  x 10  -WS         Exercise 7    Exercise Name 7  HS curl  -WS      Reps 7  15  -WS      Additional Comments  laura  -WS         Exercise 8    Exercise Name 8  HS stretch seated  -WS      Reps 8  3  -WS      Time 8  20  -WS         Exercise 9    Exercise Name 9  pt has bike, wants to trial  -WS        User Key  (r) = Recorded By, (t) = Taken By, (c) = Cosigned By    Initials Name Provider Type    Desmond Fernandes PTA Physical Therapy Assistant                       PT OP Goals     Row Name 03/18/21 1500          PT Short Term Goals    STG Date to Achieve  04/09/21  -WS     STG 1  Pt will be independent with initial HEP.  -WS     STG 1 Progress  Ongoing  -     STG 2  Pt will demonstrate good quad contraction without recruitment of gluteals, hamstrings, or gastroc.  -WS     STG 2 Progress  Ongoing  -     STG 3  Pt will demonstrate heel strike to toe off gait with assistive device.  -     STG 3 Progress  Ongoing  -        Long Term Goals    LTG Date to Achieve  05/09/21  -     LTG 1  Pt will be independent with knee \"Prehab\" program.  -WS     LTG 1 Progress  New  -     LTG 2  Pt will demonstrate increased AROM by 5 degrees (from 15 degrees active extension initially).  -     LTG 2 Progress  New  -     LTG 3  Pt will score 49% or better in KOS indicating decrease in perceived functional disability.  -     LTG 3 Progress  New  -       User Key  (r) = Recorded By, (t) = Taken By, (c) = Cosigned By    Initials Name Provider Type    Desmond Fernandes PTA Physical Therapy Assistant          Therapy Education  Given: HEP, Symptoms/condition management  Program: Reinforced, New  How Provided: Verbal  Provided to: Patient     "          Time Calculation:   Start Time: 1430  Stop Time: 1500  Time Calculation (min): 30 min  Therapy Charges for Today     Code Description Service Date Service Provider Modifiers Qty    11698751819  PT THER PROC EA 15 MIN 3/18/2021 Desmond Ventura, NIKIA GP 2                    Desmond Ventura PTA  3/18/2021

## 2021-03-25 ENCOUNTER — HOSPITAL ENCOUNTER (OUTPATIENT)
Dept: PHYSICAL THERAPY | Facility: HOSPITAL | Age: 86
Setting detail: THERAPIES SERIES
Discharge: HOME | End: 2021-03-25

## 2021-03-25 DIAGNOSIS — M25.60 RANGE OF MOTION DEFICIT: ICD-10-CM

## 2021-03-25 DIAGNOSIS — M25.562 CHRONIC PAIN OF LEFT KNEE: Primary | ICD-10-CM

## 2021-03-25 DIAGNOSIS — G89.29 CHRONIC PAIN OF LEFT KNEE: Primary | ICD-10-CM

## 2021-03-25 DIAGNOSIS — R29.898 WEAKNESS OF LEFT LOWER EXTREMITY: ICD-10-CM

## 2021-03-25 DIAGNOSIS — R26.9 GAIT DIFFICULTY: ICD-10-CM

## 2021-03-25 PROCEDURE — 97110 THERAPEUTIC EXERCISES: CPT

## 2021-03-25 NOTE — THERAPY TREATMENT NOTE
Outpatient Physical Therapy Ortho Treatment Note  [facility]     Patient Name: Madina Coughlin  : 1935  MRN: [id number]  Today's Date: 3/25/2021      Visit Date: 2021    Visit Dx:    ICD-10-CM ICD-9-CM   1. Chronic pain of left knee  M25.562 719.46    G89.29 338.29   2. Weakness of left lower extremity  R29.898 729.89   3. Gait difficulty  R26.9 781.2   4. Range of motion deficit  M25.60 719.50       Patient Active Problem List   Diagnosis   • HTN (hypertension)   • Hypothyroid   • IBS (irritable bowel syndrome)   • SVT (supraventricular tachycardia) (CMS/HCC)        Past Medical History:   Diagnosis Date   • Abnormal heart rate    • Disease of thyroid gland         No past surgical history on file.                    PT Assessment/Plan     Row Name 21 1318          PT Assessment    Assessment Comments  Patient tolerated added weights to open chain exercises. Patient contemplating if she wants to have knee replaced since she has decreaed pain  -WS       User Key  (r) = Recorded By, (t) = Taken By, (c) = Cosigned By    Initials Name Provider Type    Desmond Fernandes PTA Physical Therapy Assistant            OP Exercises     Row Name 21 1246             Subjective Comments    Subjective Comments  haven't needed a cane to walk this week. Tried to take a long walk with a rolator but it caused pain  -WS         Subjective Pain    Able to rate subjective pain?  yes  -WS      Pre-Treatment Pain Level  0  -WS         Total Minutes    40519 - PT Therapeutic Exercise Minutes  30  -WS         Exercise 1    Exercise Name 1  nustep LE L4  -WS      Time 1  5 min  -WS         Exercise 2    Exercise Name 2  Quad set w/towel roll  -WS      Cueing 2  Verbal;Tactile  -WS      Time 2  15  -WS      Additional Comments  laura  -WS         Exercise 3    Exercise Name 3  SAQ  -WS      Cueing 3  Verbal;Tactile  -WS      Reps 3  15  -WS      Time 3  3sec  -WS      Additional Comments  laura 3#  -WS          "Exercise 4    Exercise Name 4  Glute sets  -WS      Cueing 4  Verbal;Tactile  -WS      Reps 4  15  -WS      Time 4  3sec  -WS         Exercise 5    Exercise Name 5  LAQ  -WS      Cueing 5  Verbal;Tactile;Demo  -WS      Reps 5  15  -WS      Time 5  3sec  -WS      Additional Comments  laura 3#  -WS         Exercise 6    Exercise Name 6  Sit to stand strategy, practice to reduce UE assist.  -WS      Time 6  x 15  -WS         Exercise 7    Exercise Name 7  HS curl  -WS      Reps 7  15  -WS      Additional Comments  bi 3#  -WS         Exercise 8    Exercise Name 8  HS stretch seated  -WS      Reps 8  3  -WS      Time 8  20  -WS         Exercise 9    Exercise Name 9  add sidestep  -WS        User Key  (r) = Recorded By, (t) = Taken By, (c) = Cosigned By    Initials Name Provider Type    Desmond Fernandes PTA Physical Therapy Assistant                       PT OP Goals     Row Name 03/25/21 1300          PT Short Term Goals    STG Date to Achieve  04/09/21  -     STG 1  Pt will be independent with initial HEP.  -WS     STG 1 Progress  Ongoing  -     STG 2  Pt will demonstrate good quad contraction without recruitment of gluteals, hamstrings, or gastroc.  -     STG 2 Progress  Ongoing  -     STG 3  Pt will demonstrate heel strike to toe off gait with assistive device.  -     STG 3 Progress  Ongoing  -        Long Term Goals    LTG Date to Achieve  05/09/21  -     LTG 1  Pt will be independent with knee \"Prehab\" program.  -     LTG 1 Progress  Ongoing  -     LTG 2  Pt will demonstrate increased AROM by 5 degrees (from 15 degrees active extension initially).  -     LTG 2 Progress  Ongoing  -     LTG 3  Pt will score 49% or better in KOS indicating decrease in perceived functional disability.  -     LTG 3 Progress  --  -       User Key  (r) = Recorded By, (t) = Taken By, (c) = Cosigned By    Initials Name Provider Type    Desmond Fernandes PTA Physical Therapy Assistant          Therapy " Education  Given: HEP, Symptoms/condition management  Program: Reinforced  How Provided: Verbal  Provided to: Patient              Time Calculation:   Start Time: 1246  Stop Time: 1316  Time Calculation (min): 30 min  Therapy Charges for Today     Code Description Service Date Service Provider Modifiers Qty    52439345278 HC PT THER PROC EA 15 MIN 3/25/2021 Desmond Ventura, PTA GP 2                    Desmond Ventura PTA  3/25/2021

## 2021-04-01 ENCOUNTER — HOSPITAL ENCOUNTER (OUTPATIENT)
Dept: PHYSICAL THERAPY | Facility: HOSPITAL | Age: 86
Setting detail: THERAPIES SERIES
Discharge: HOME | End: 2021-04-01

## 2021-04-01 DIAGNOSIS — R26.9 GAIT DIFFICULTY: ICD-10-CM

## 2021-04-01 DIAGNOSIS — R29.898 WEAKNESS OF LEFT LOWER EXTREMITY: ICD-10-CM

## 2021-04-01 DIAGNOSIS — M25.60 RANGE OF MOTION DEFICIT: ICD-10-CM

## 2021-04-01 DIAGNOSIS — G89.29 CHRONIC PAIN OF LEFT KNEE: Primary | ICD-10-CM

## 2021-04-01 DIAGNOSIS — M25.562 CHRONIC PAIN OF LEFT KNEE: Primary | ICD-10-CM

## 2021-04-01 PROCEDURE — 97110 THERAPEUTIC EXERCISES: CPT

## 2021-04-01 NOTE — THERAPY TREATMENT NOTE
Outpatient Physical Therapy Ortho Treatment Note  [facility]     Patient Name: Madina Coughlin  : 1935  MRN: [id number]  Today's Date: 2021      Visit Date: 2021    Visit Dx:    ICD-10-CM ICD-9-CM   1. Chronic pain of left knee  M25.562 719.46    G89.29 338.29   2. Weakness of left lower extremity  R29.898 729.89   3. Gait difficulty  R26.9 781.2   4. Range of motion deficit  M25.60 719.50       Patient Active Problem List   Diagnosis   • HTN (hypertension)   • Hypothyroid   • IBS (irritable bowel syndrome)   • SVT (supraventricular tachycardia) (CMS/HCC)        Past Medical History:   Diagnosis Date   • Abnormal heart rate    • Disease of thyroid gland         No past surgical history on file.                    PT Assessment/Plan     Row Name 21 1200          PT Assessment    Assessment Comments  Pt demonstrates improving quad control however L is still inconsistent compared to R.  She is progressing toward goals. Added hip girdle strengthening in HL, pt noted the weakness/lack of control and we discussed how it relates to gait.  Pt would benefit from continuing skilled therapy services.    -JA        PT Plan    PT Plan Comments  reasses next visit; assess response to new exs, cont progressing.  -SELENA       User Key  (r) = Recorded By, (t) = Taken By, (c) = Cosigned By    Initials Name Provider Type    Isela Coates, PT Physical Therapist            OP Exercises     Row Name 21 1100             Subjective Comments    Subjective Comments  The pain is coming back, I think it was a false reduction after the steroid shot.  Seems the same pain with or without the cane but i feel stteadier with it.  -SELENA         Subjective Pain    Able to rate subjective pain?  yes  -SELENA      Pre-Treatment Pain Level  3  -JA         Total Minutes    97426 - PT Therapeutic Exercise Minutes  40  -JA         Exercise 1    Exercise Name 1  haroldo NAYLOR L4  -JA      Time 1  5 min  -JA         Exercise 2     Exercise Name 2  Quad set w/towel roll  -JA      Cueing 2  Verbal;Tactile  -JA      Time 2  15  -JA         Exercise 3    Exercise Name 3  SAQ  -JA      Cueing 3  Verbal;Tactile  -JA      Reps 3  15  -JA      Time 3  3sec  -JA      Additional Comments  laura 3#  -JA         Exercise 4    Exercise Name 4  Glute sets  -JA      Cueing 4  Verbal;Tactile  -JA      Reps 4  15  -JA      Time 4  3sec  -JA         Exercise 5    Exercise Name 5  LAQ  -JA      Cueing 5  Verbal;Tactile;Demo  -JA      Reps 5  15  -JA      Time 5  3sec  -JA      Additional Comments  laura 3#  -JA         Exercise 6    Exercise Name 6  Sit to stand strategy, practice to reduce UE assist.  -JA      Time 6  x 15  -JA         Exercise 7    Exercise Name 7  HS curl  -JA      Reps 7  15  -JA      Additional Comments  laura 3#  -JA         Exercise 8    Exercise Name 8  HS stretch seated  -JA      Reps 8  3  -JA      Time 8  20  -JA         Exercise 9    Exercise Name 9  added HL abd w/tband  -JA      Cueing 9  Verbal;Tactile  -JA      Sets 9  3 (1 set ea laura, L, R)  -JA      Reps 9  10  -JA         Exercise 10    Exercise Name 10  added HL add w/ball  -JA      Cueing 10  Verbal;Tactile  -JA      Reps 10  10  -JA        User Key  (r) = Recorded By, (t) = Taken By, (c) = Cosigned By    Initials Name Provider Type    Isela Coates, PT Physical Therapist                       PT OP Goals     Row Name 04/01/21 1100          PT Short Term Goals    STG Date to Achieve  04/09/21  -SELENA     STG 1  Pt will be independent with initial HEP.  -SELENA     STG 1 Progress  Met  -SELENA     STG 2  Pt will demonstrate good quad contraction without recruitment of gluteals, hamstrings, or gastroc.  -SELENA     STG 2 Progress  Met  -SELENA     STG 2 Progress Comments  minor cuing to relax calf  -JA     STG 3  Pt will demonstrate heel strike to toe off gait with assistive device.  -SELENA     STG 3 Progress  Met  -SELENA        Long Term Goals    LTG Date to Achieve  05/09/21  -SELENA     LTG 1   "Pt will be independent with knee \"Prehab\" program.  -SELENA     LTG 1 Progress  Progressing  -SELENA     LTG 2  Pt will demonstrate increased AROM by 5 degrees (from 15 degrees active extension initially).  -SELENA     LTG 2 Progress  Ongoing  -SELENA     LTG 3  Pt will score 49% or better in KOS indicating decrease in perceived functional disability.  -SELENA     LTG 3 Progress  Ongoing  -SELENA       User Key  (r) = Recorded By, (t) = Taken By, (c) = Cosigned By    Initials Name Provider Type    Isela Coates, PT Physical Therapist          Therapy Education  Education Details: [id number]  added HL hip abd w/tband yellow and HL add w/ball.  Further education for strengthening and increased awareness of quad activation on weak L.    Given: HEP, Symptoms/condition management, Pain management, Posture/body mechanics  Program: Reinforced, Progressed  How Provided: Verbal, Demonstration, Written  Provided to: Patient  Level of Understanding: Teach back education performed, Verbalized, Demonstrated              Time Calculation:   Start Time: 1145  Stop Time: 1225  Time Calculation (min): 40 min  Therapy Charges for Today     Code Description Service Date Service Provider Modifiers Qty    90099201677 HC PT THER PROC EA 15 MIN 4/1/2021 Isela Roper PT GP 3                    Isela Roper PT  4/1/2021     "

## 2021-04-07 ENCOUNTER — HOSPITAL ENCOUNTER (OUTPATIENT)
Dept: PHYSICAL THERAPY | Facility: HOSPITAL | Age: 86
Setting detail: THERAPIES SERIES
Discharge: HOME | End: 2021-04-07

## 2021-04-07 DIAGNOSIS — G89.29 CHRONIC PAIN OF LEFT KNEE: Primary | ICD-10-CM

## 2021-04-07 DIAGNOSIS — M25.562 CHRONIC PAIN OF LEFT KNEE: Primary | ICD-10-CM

## 2021-04-07 DIAGNOSIS — M25.60 RANGE OF MOTION DEFICIT: ICD-10-CM

## 2021-04-07 DIAGNOSIS — R29.898 WEAKNESS OF LEFT LOWER EXTREMITY: ICD-10-CM

## 2021-04-07 DIAGNOSIS — R26.9 GAIT DIFFICULTY: ICD-10-CM

## 2021-04-07 PROCEDURE — 97530 THERAPEUTIC ACTIVITIES: CPT

## 2021-04-07 PROCEDURE — 97110 THERAPEUTIC EXERCISES: CPT

## 2021-04-07 NOTE — THERAPY PROGRESS REPORT/RE-CERT
Outpatient Physical Therapy Ortho Progress Note  [facility]     Patient Name: Madina Coughlin  : 1935  MRN: [id number]  Today's Date: 2021      Visit Date: 2021    Patient Active Problem List   Diagnosis   • HTN (hypertension)   • Hypothyroid   • IBS (irritable bowel syndrome)   • SVT (supraventricular tachycardia) (CMS/HCC)        Past Medical History:   Diagnosis Date   • Abnormal heart rate    • Disease of thyroid gland         No past surgical history on file.    Visit Dx:     ICD-10-CM ICD-9-CM   1. Chronic pain of left knee  M25.562 719.46    G89.29 338.29   2. Weakness of left lower extremity  R29.898 729.89   3. Gait difficulty  R26.9 781.2   4. Range of motion deficit  M25.60 719.50             PT Ortho     Row Name 21 1400       Subjective Comments    Subjective Comments  I haven't gone to the exercise room yet.  -JA       Subjective Pain    Able to rate subjective pain?  yes  -SELENA    Pre-Treatment Pain Level  3  -JA       Right Lower Ext    RT Lower Extremity Comments    -JA       Left Lower Ext    Lt Knee Extension/Flexion AROM  15-12  -JA      User Key  (r) = Recorded By, (t) = Taken By, (c) = Cosigned By    Initials Name Provider Type    Isela Coates, PT Physical Therapist                      Therapy Education  Education Details: [id number]  printed hip ADD with ball for home;  discussed her AROM extension not changing yet on involved/weak side and strengthening take consistent strengthening over time.  worked on heel strike with DF in gait.    Given: Symptoms/condition management, Pain management, Posture/body mechanics, Mobility training  Program: Reinforced, Progressed  How Provided: Verbal, Demonstration, Written  Provided to: Patient  Level of Understanding: Teach back education performed, Verbalized, Demonstrated     PT OP Goals     Row Name 21 1300          PT Short Term Goals    STG Date to Achieve  21  -SELENA     STG 1  Pt will be independent  "with initial HEP.  -     STG 1 Progress  Met  -     STG 2  Pt will demonstrate good quad contraction without recruitment of gluteals, hamstrings, or gastroc.  -     STG 2 Progress  Met  -     STG 3  Pt will demonstrate heel strike to toe off gait with assistive device.  -     STG 3 Progress  Met  -        Long Term Goals    LTG Date to Achieve  05/09/21  -     LTG 1  Pt will be independent with knee \"Prehab\" program.  -     LTG 1 Progress  Progressing  -     LTG 2  Pt will demonstrate increased AROM by 5 degrees (from 15 degrees active extension initially).  -     LTG 2 Progress  Ongoing  -     LTG 2 Progress Comments  not yet  -     LTG 3  Pt will score 49% or better in KOS indicating decrease in perceived functional disability.  -     LTG 3 Progress  Ongoing  -     LTG 3 Progress Comments  35%, score decreased--likely due to increased awareness of deficits  -SELENA       User Key  (r) = Recorded By, (t) = Taken By, (c) = Cosigned By    Initials Name Provider Type    Isela Coates, PT Physical Therapist          PT Assessment/Plan     Row Name 04/07/21 1400          PT Assessment    Assessment Comments  Pt has been seen for 5 visits for L knee pain secondary to severe OA and recent onset of pain.  She has gait deviations and impaired functional mobility, lacks heel strike with gait.  She has met 3 of 3 STGs, LTG progressing or ongoing.  We discussed that is takes time for muscle to build with consistent over time.  Her L quad was significantly deconditioned/atrophied and she is only now able to activate her quads without substituting other muscles.  She will benefit from continuing skilled therapy services.  -SELENA        PT Plan    PT Plan Comments  add foam next visit, can D/C some ther ex since doing at home and progress with functional mobility, however let's keep focused supported position quad work since it is 3/5.  -SELENA       User Key  (r) = Recorded By, (t) = Taken By, (c) = " Cosigned By    Initials Name Provider Type    Isela Coates, PT Physical Therapist            OP Exercises     Row Name 04/07/21 1400 04/07/21 1300          Subjective Comments    Subjective Comments  I haven't gone to the exercise room yet.  -JA  --        Subjective Pain    Able to rate subjective pain?  yes  -JA  --     Pre-Treatment Pain Level  3  -JA  --        Total Minutes    32974 - PT Therapeutic Exercise Minutes  --  35  -JA     14416 - PT Therapeutic Activity Minutes  --  10  -JA        Exercise 1    Exercise Name 1  --  nustep LE  -JA     Time 1  --  5 min  -JA     Additional Comments  --  L5  -JA        Exercise 2    Exercise Name 2  --  Quad set w/towel roll  -JA     Cueing 2  --  Verbal;Tactile  -JA     Time 2  --  15  -JA        Exercise 3    Exercise Name 3  --  SAQ  -JA     Cueing 3  --  Verbal;Tactile  -JA     Sets 3  --  2  -JA     Reps 3  --  10  -JA     Time 3  --  3sec  -JA     Additional Comments  --  3# laura  -JA        Exercise 4    Exercise Name 4  --  Glute sets  -JA     Cueing 4  --  Verbal;Tactile  -JA     Reps 4  --  15  -JA     Time 4  --  3sec  -JA        Exercise 5    Exercise Name 5  --  LAQ  -JA     Cueing 5  --  Verbal;Tactile;Demo  -JA     Reps 5  --  15  -JA     Time 5  --  3sec  -JA     Additional Comments  --  3# laura  -JA        Exercise 6    Exercise Name 6  --  Sit to stand strategy, practice to reduce UE assist.  -JA     Time 6  --  x 15  -JA        Exercise 7    Exercise Name 7  --  HS curl  -JA     Reps 7  --  15  -JA     Additional Comments  --  laura 3#  -JA        Exercise 8    Exercise Name 8  --  HS stretch seated  -JA     Reps 8  --  3  -JA     Time 8  --  20  -JA        Exercise 9    Exercise Name 9  --  HL abd w/tband  -JA     Cueing 9  --  Verbal;Tactile  -JA     Sets 9  --  3 (1 set ea laura, L, R)  -JA     Reps 9  --  10  -JA     Additional Comments  --  yellow  -JA        Exercise 10    Exercise Name 10  --  HL add w/ball  -JA     Cueing 10  --   Verbal;Tactile  -JA     Reps 10  --  10  -JA     Time 10  --  3sec  -JA        Exercise 11    Exercise Name 11  --  gait work Heel strike to toe off  -JA     Reps 11  --  3 laps  -JA        Exercise 12    Exercise Name 12  --  DLS  -JA     Reps 12  --  4 HT  -JA     Additional Comments  --  floor  -JA        Exercise 13    Exercise Name 13  --  1/2 tandem  -JA     Reps 13  --  4 HT  -JA     Additional Comments  --  floor  -JA       User Key  (r) = Recorded By, (t) = Taken By, (c) = Cosigned By    Initials Name Provider Type    Isela Coates, PT Physical Therapist                        Outcome Measure Options: Knee Outcome Score- ADL  Knee Outcome Score  Knee Outcome Score Comments: 35%      Time Calculation:     Start Time: 1330  Stop Time: 1415  Time Calculation (min): 45 min     Therapy Charges for Today     Code Description Service Date Service Provider Modifiers Qty    75939878926  PT THER PROC EA 15 MIN 4/7/2021 Isela Roper, PT GP 2    [id number]  PT THERAPEUTIC ACT EA 15 MIN 4/7/2021 Isela Roper, PT GP 1          PT G-Codes  Outcome Measure Options: Knee Outcome Score- ADL         Isela Roper PT  4/7/2021

## 2021-04-14 ENCOUNTER — HOSPITAL ENCOUNTER (OUTPATIENT)
Dept: PHYSICAL THERAPY | Facility: HOSPITAL | Age: 86
Setting detail: THERAPIES SERIES
Discharge: HOME | End: 2021-04-14

## 2021-04-14 DIAGNOSIS — M25.562 CHRONIC PAIN OF LEFT KNEE: Primary | ICD-10-CM

## 2021-04-14 DIAGNOSIS — R26.9 GAIT DIFFICULTY: ICD-10-CM

## 2021-04-14 DIAGNOSIS — G89.29 CHRONIC PAIN OF LEFT KNEE: Primary | ICD-10-CM

## 2021-04-14 DIAGNOSIS — M25.60 RANGE OF MOTION DEFICIT: ICD-10-CM

## 2021-04-14 DIAGNOSIS — R29.898 WEAKNESS OF LEFT LOWER EXTREMITY: ICD-10-CM

## 2021-04-14 PROCEDURE — 97530 THERAPEUTIC ACTIVITIES: CPT | Performed by: PHYSICAL THERAPIST

## 2021-04-14 PROCEDURE — 97110 THERAPEUTIC EXERCISES: CPT | Performed by: PHYSICAL THERAPIST

## 2021-04-21 ENCOUNTER — HOSPITAL ENCOUNTER (OUTPATIENT)
Dept: PHYSICAL THERAPY | Facility: HOSPITAL | Age: 86
Setting detail: THERAPIES SERIES
Discharge: HOME | End: 2021-04-21

## 2021-04-21 DIAGNOSIS — R29.898 WEAKNESS OF LEFT LOWER EXTREMITY: ICD-10-CM

## 2021-04-21 DIAGNOSIS — G89.29 CHRONIC PAIN OF LEFT KNEE: Primary | ICD-10-CM

## 2021-04-21 DIAGNOSIS — R26.9 GAIT DIFFICULTY: ICD-10-CM

## 2021-04-21 DIAGNOSIS — M25.562 CHRONIC PAIN OF LEFT KNEE: Primary | ICD-10-CM

## 2021-04-21 DIAGNOSIS — M25.60 RANGE OF MOTION DEFICIT: ICD-10-CM

## 2021-04-21 PROCEDURE — 97110 THERAPEUTIC EXERCISES: CPT

## 2021-04-21 PROCEDURE — 97530 THERAPEUTIC ACTIVITIES: CPT

## 2021-04-21 NOTE — THERAPY TREATMENT NOTE
Outpatient Physical Therapy Ortho Treatment Note  [facility]     Patient Name: Madina Coughlin  : 1935  MRN: [id number]  Today's Date: 2021      Visit Date: 2021    Visit Dx:    ICD-10-CM ICD-9-CM   1. Chronic pain of left knee  M25.562 719.46    G89.29 338.29   2. Weakness of left lower extremity  R29.898 729.89   3. Gait difficulty  R26.9 781.2   4. Range of motion deficit  M25.60 719.50       Patient Active Problem List   Diagnosis   • HTN (hypertension)   • Hypothyroid   • IBS (irritable bowel syndrome)   • SVT (supraventricular tachycardia) (CMS/HCC)        Past Medical History:   Diagnosis Date   • Abnormal heart rate    • Disease of thyroid gland         No past surgical history on file.                    PT Assessment/Plan     Row Name 21 1400          PT Assessment    Assessment Comments  Pt has palpable control of initial activation of quads on L however it is not well-sustained due to weakness.  This is an improvement from when she began therapy.  She demonstrates and verbalizes improved understanding and awareness of muscles and muscle group weakness and how is affects stabilizers and prime movers.  Her gait is asymmetrical however with a STC it improves and pain is reduced; working to improve heel strike on left however that may be hindered by quad weakness during swing and lack of terminal knee extension.  Pt benefits from focused work and is receptive to education.  We worked on STS strategy with good result in decreased use of UE's.  -SELENA        PT Plan    PT Plan Comments  add standing calf stretch if tolerated with goal to also achieve full knee extension.; review STS  -SELENA       User Key  (r) = Recorded By, (t) = Taken By, (c) = Cosigned By    Initials Name Provider Type    Isela Coates, PT Physical Therapist            OP Exercises     Row Name 21 1300             Subjective Comments    Subjective Comments  I had a really sharp pain one day.  -SELENA          Subjective Pain    Able to rate subjective pain?  yes  -JA      Pre-Treatment Pain Level  5  -JA         Total Minutes    21217 - PT Therapeutic Exercise Minutes  30  -JA      04947 - PT Therapeutic Activity Minutes  15  -JA         Exercise 1    Exercise Name 1  nustep LE  -JA      Cueing 1  Verbal  -JA      Time 1  5 min  -JA      Additional Comments  L4  -JA         Exercise 2    Exercise Name 2  Quad set w/towel roll  -JA      Cueing 2  Verbal;Tactile  -JA      Time 2  15  -JA         Exercise 3    Exercise Name 3  SAQ  -JA      Cueing 3  Verbal;Tactile  -JA      Sets 3  2  -JA      Reps 3  10  -JA      Time 3  3sec  -JA      Additional Comments  3# laura; cued TA  -JA         Exercise 4    Exercise Name 4  Glute sets  -JA      Cueing 4  Verbal;Tactile  -JA      Reps 4  15  -JA      Time 4  3sec  -JA         Exercise 5    Exercise Name 5  LAQ  -JA      Cueing 5  Verbal;Tactile;Demo  -JA      Reps 5  15  -JA      Time 5  3sec  -JA      Additional Comments  3# laura  -JA         Exercise 6    Exercise Name 6  Sit to stand strategy, practice to reduce UE assist.  -JA      Cueing 6  Verbal;Demo  -JA      Time 6  3 x 5   -JA         Exercise 7    Exercise Name 7  HS curl  -JA      Cueing 7  Verbal;Demo  -JA      Reps 7  15  -JA      Additional Comments  3# laura  -JA         Exercise 8    Exercise Name 8  HS stretch seated  -JA      Cueing 8  Verbal;Demo  -JA      Reps 8  3  -JA      Time 8  20  -JA      Additional Comments  laura  -JA         Exercise 9    Exercise Name 9  HL hip abd w/ tband  -JA      Cueing 9  Verbal;Tactile  -JA      Sets 9  3 (1 set ea laura, L, R)  -JA      Reps 9  10 ea  -JA      Time 9  2-3 sec  -JA      Additional Comments  red  -JA         Exercise 10    Exercise Name 10  HL add w/ball  -JA      Cueing 10  Verbal;Tactile  -JA      Reps 10  15  -JA      Time 10  3sec  -JA         Exercise 11    Exercise Name 11  gait work Heel strike to toe off, 1 UE support  -JA      Cueing 11  Verbal;Demo  -JA    "   Reps 11  3 laps  -JA         Exercise 12    Exercise Name 12  DLS  -JA      Cueing 12  Verbal;Demo  -JA      Reps 12  4 HT  -JA      Additional Comments  progressed to foam  -JA         Exercise 13    Exercise Name 13  1/2 tandem  -JA      Cueing 13  Verbal;Demo  -JA      Reps 13  4 HT  -JA      Additional Comments  added progression to foam  -JA         Exercise 14    Exercise Name 14  Blue foam, static balance   -JA      Cueing 14  Verbal;Demo  -JA      Reps 14  3  -JA      Time 14  30 sec ea rhomberg (EO), semi tandem w/ R foot and w/ L foot forward  -JA      Additional Comments  added heaad turns  -JA        User Key  (r) = Recorded By, (t) = Taken By, (c) = Cosigned By    Initials Name Provider Type    Isela Coates, PT Physical Therapist                       PT OP Goals     Row Name 04/21/21 1400          PT Short Term Goals    STG Date to Achieve  04/09/21  -SELENA     STG 1  Pt will be independent with initial HEP.  -SELENA     STG 1 Progress  Met  -     STG 2  Pt will demonstrate good quad contraction without recruitment of gluteals, hamstrings, or gastroc.  -SELENA     STG 2 Progress  Met  -     STG 3  Pt will demonstrate heel strike to toe off gait with assistive device.  -     STG 3 Progress  Met  -        Long Term Goals    LTG Date to Achieve  05/09/21  -SELENA     LTG 1  Pt will be independent with knee \"Prehab\" program.  -JA     LTG 1 Progress  Progressing  -JA     LTG 2  Pt will demonstrate increased AROM by 5 degrees (from 15 degrees active extension initially).  -JA     LTG 2 Progress  Ongoing  -     LTG 2 Progress Comments  not yet  -JA     LTG 3  Pt will score 49% or better in KOS indicating decrease in perceived functional disability.  -     LTG 3 Progress  Ongoing  -       User Key  (r) = Recorded By, (t) = Taken By, (c) = Cosigned By    Initials Name Provider Type    Isela Coates, PT Physical Therapist          Therapy Education  Education Details: Educated for STS " strategy-scoot forward toward edge of chair, feet under knees more, flex at hips to shift weight from bottom to the feet and stand when weight shifts.  When sitting, flex at hips and keep that position as you bend knees and hips until bottom touches the seat.  Given: Symptoms/condition management, Pain management, Posture/body mechanics, Mobility training  Program: Reinforced, Progressed  How Provided: Verbal, Demonstration  Provided to: Patient  Level of Understanding: Teach back education performed, Verbalized, Demonstrated              Time Calculation:   Start Time: 1335  Stop Time: 1420  Time Calculation (min): 45 min  Therapy Charges for Today     Code Description Service Date Service Provider Modifiers Qty    [id number]  PT THER PROC EA 15 MIN 4/21/2021 Isela Roper, PT GP 2    [id number]  PT THERAPEUTIC ACT EA 15 MIN 4/21/2021 Isela Roper, PT GP 1                    Isela Roper PT  4/21/2021

## 2021-04-28 ENCOUNTER — PREP FOR SURGERY (OUTPATIENT)
Dept: OTHER | Facility: HOSPITAL | Age: 86
End: 2021-04-28

## 2021-04-28 ENCOUNTER — OFFICE VISIT (OUTPATIENT)
Dept: ORTHOPEDIC SURGERY | Facility: CLINIC | Age: 86
End: 2021-04-28

## 2021-04-28 ENCOUNTER — HOSPITAL ENCOUNTER (OUTPATIENT)
Dept: PHYSICAL THERAPY | Facility: HOSPITAL | Age: 86
Setting detail: THERAPIES SERIES
Discharge: HOME | End: 2021-04-28

## 2021-04-28 VITALS — TEMPERATURE: 97.5 F | WEIGHT: 163 LBS | BODY MASS INDEX: 24.71 KG/M2 | HEIGHT: 68 IN

## 2021-04-28 DIAGNOSIS — G89.29 CHRONIC PAIN OF LEFT KNEE: Primary | ICD-10-CM

## 2021-04-28 DIAGNOSIS — M17.12 ARTHRITIS OF LEFT KNEE: Primary | ICD-10-CM

## 2021-04-28 DIAGNOSIS — R29.898 WEAKNESS OF LEFT LOWER EXTREMITY: ICD-10-CM

## 2021-04-28 DIAGNOSIS — M25.60 RANGE OF MOTION DEFICIT: ICD-10-CM

## 2021-04-28 DIAGNOSIS — M25.562 CHRONIC PAIN OF LEFT KNEE: Primary | ICD-10-CM

## 2021-04-28 DIAGNOSIS — R26.9 GAIT DIFFICULTY: ICD-10-CM

## 2021-04-28 PROCEDURE — 99214 OFFICE O/P EST MOD 30 MIN: CPT | Performed by: ORTHOPAEDIC SURGERY

## 2021-04-28 PROCEDURE — 97110 THERAPEUTIC EXERCISES: CPT | Performed by: PHYSICAL THERAPIST

## 2021-04-28 PROCEDURE — 97112 NEUROMUSCULAR REEDUCATION: CPT | Performed by: PHYSICAL THERAPIST

## 2021-04-28 RX ORDER — CEFAZOLIN SODIUM 2 G/100ML
2 INJECTION, SOLUTION INTRAVENOUS ONCE
Status: CANCELLED | OUTPATIENT
Start: 2021-06-07 | End: 2021-04-28

## 2021-04-28 RX ORDER — ACETAMINOPHEN 500 MG
1000 TABLET ORAL ONCE
Status: CANCELLED | OUTPATIENT
Start: 2021-06-07 | End: 2021-04-28

## 2021-04-28 RX ORDER — MELOXICAM 15 MG/1
15 TABLET ORAL ONCE
Status: CANCELLED | OUTPATIENT
Start: 2021-06-07 | End: 2021-04-28

## 2021-04-28 RX ORDER — CHLORHEXIDINE GLUCONATE 500 MG/1
1 CLOTH TOPICAL TAKE AS DIRECTED
Status: CANCELLED | OUTPATIENT
Start: 2021-04-28

## 2021-04-28 RX ORDER — PREGABALIN 75 MG/1
150 CAPSULE ORAL ONCE
Status: CANCELLED | OUTPATIENT
Start: 2021-06-07 | End: 2021-04-28

## 2021-04-28 NOTE — PROGRESS NOTES
Patient Name: Madina Coughlin   YOB: 1935  Referring Primary Care Physician: Tara Russell MD  BMI: Body mass index is 24.78 kg/m².    Chief Complaint:    Chief Complaint   Patient presents with   • Left Knee - Establish Care, Pain        HPI:     Madina Coughlin is a 85 y.o. female who presents today for evaluation of   Chief Complaint   Patient presents with   • Left Knee - Establish Care, Pain   .The patient recalls her left knee pain starting about 2 months ago. She notes that she is a walker and has been pushing herself to walk for exercise. She was not able to walk when the pain started. She is using a Featherlite wheelchair today. She does use a cane as well. The patient had a corticosteroid injection in 02/2021 with some relief. The patient also has arthritis and ankle pain. She also notes that she has one leg longer than the other. She has been taking Tylenol and curcumin, but her PCP does not want her taking ibuprofen. The patient is no longer taking the Meloxicam.     The patient has had hypertension and SVTs. She was prescribed beta blockers by Dr. Smith.     The patient is retired from [employer]. She is also an artist.      Subjective   Medications:   Home Medications:  Current Outpatient Medications on File Prior to Visit   Medication Sig   • azelastine (ASTELIN) 0.1 % nasal spray USE 1 TO 2 PUFFS IN EACH NOSTRIL AT BEDTIME   • Cetirizine HCl 10 MG capsule Take  by mouth.   • Digestive Enzymes (DIGESTIVE ENZYME PO) Take  by mouth.   • levothyroxine (SYNTHROID, LEVOTHROID) 50 MCG tablet Take 50 mcg by mouth Daily.   • liothyronine (CYTOMEL) 5 MCG tablet Take 5 mcg by mouth Daily.   • metoprolol succinate XL (TOPROL-XL) 50 MG 24 hr tablet TAKE 1 TABLET EVERY DAY   • multivitamin (MULTI-VITAMIN PO) Take  by mouth Daily.   • Probiotic Product (PROBIOTIC PO) Take  by mouth.   • nystatin (MYCOSTATIN) 239654 UNIT/GM cream Apply  topically 2 (Two) Times a Day. to affected area  as needed     No current facility-administered medications on file prior to visit.     Current Medications:  Scheduled Meds:  Continuous Infusions:No current facility-administered medications for this visit.    PRN Meds:.    I have reviewed the patient's medical history in detail and updated the computerized patient record.  Review and summarization of old records includes:    Past Medical History:   Diagnosis Date   • Abnormal heart rate    • Disease of thyroid gland       History reviewed. No pertinent surgical history.     Social History     Occupational History   • Not on file   Tobacco Use   • Smoking status: Never Smoker   • Smokeless tobacco: Never Used   Substance and Sexual Activity   • Alcohol use: Not on file   • Drug use: Not on file   • Sexual activity: Not Currently      Social History     Social History Narrative   • Not on file        Family History   Problem Relation Age of Onset   • Coronary artery disease Paternal Grandfather    • Coronary artery disease Maternal Grandfather    • Breast cancer Maternal Aunt    • Lymphoma Brother    • Ovarian cancer Neg Hx    • Uterine cancer Neg Hx    • Colon cancer Neg Hx        ROS: 14 point review of systems was performed and all other systems were reviewed and are negative except for documented findings in HPI and today's encounter.     Allergies: No Known Allergies  Constitutional:  Denies fever, shaking or chills   Eyes:  Denies change in visual acuity   HENT:  Denies nasal congestion or sore throat   Respiratory:  Denies cough or shortness of breath   Cardiovascular:  Denies chest pain or severe LE edema   GI:  Denies abdominal pain, nausea, vomiting, bloody stools or diarrhea   Musculoskeletal:  Numbness, tingling, pain, or loss of motor function only as noted above in history of present illness.  : Denies painful urination or hematuria  Integument:  Denies rash, lesion or ulceration   Neurologic:  Denies headache or focal weakness  Endocrine:  Denies  "lymphadenopathy  Psych:  Denies confusion or change in mental status   Hem:  Denies active bleeding    OBJECTIVE:  Physical Exam: 85 y.o. female  Wt Readings from Last 3 Encounters:   04/28/21 73.9 kg (163 lb)   02/18/21 72.6 kg (160 lb)   11/10/20 75.2 kg (165 lb 12.8 oz)     Ht Readings from Last 1 Encounters:   04/28/21 172.7 cm (68\")     Body mass index is 24.78 kg/m².  Vitals:    04/28/21 1331   Temp: 97.5 °F (36.4 °C)     Vital signs reviewed.     General Appearance:    Alert, cooperative, in no acute distress                  Eyes: conjunctiva clear  ENT: external ears and nose atraumatic  CV: no peripheral edema  Resp: normal respiratory effort  Skin: no rashes or wounds; normal turgor  Psych: mood and affect appropriate  Lymph: no nodes appreciated  Neuro: gross sensation intact  Vascular:  Palpable peripheral pulse in noted extremity  Musculoskeletal Extremities: Examination of the left knee shows anterior and posterior swelling. Range of motion is great. Flexion to 120 degrees. There is pseudolaxity presents as well as some joint line tenderness.     Radiology:   AP lateral 40 degree PA x-ray left knee taken the office in the past and reviewed at this time without comparison views available show severe arthritic change      Procedures      Assessment:     ICD-10-CM ICD-9-CM   1. Arthritis of left knee  M17.12 716.96        MDM/Plan:   The diagnosis(es), natural history, pathophysiology and treatment for diagnosis(es) were discussed. Opportunity given and questions answered.  Biomechanics of pertinent body areas discussed.  When appropriate, the use of ambulatory aids discussed.    We discussed the nature of her left knee pain. We reviewed her x-rays which showed arthritis with bone spurs forming. I recommended that she use ice, heat and liniments combined with her continued used of curcumin. We discussed the risks and benefits of surgery, if the corticosteroid injections are not providing her any relief. " I also advised that we would need clearance from Dr. Russell, who may then need clearance from her cardiologist. . She was curious if the total knee arthroplasty would improve her ankle pain and we discussed that it was unknown.     Inflammation/pain control; with cold, heat, elevation and/or liniments discussed as appropriate  HOME EXERCISE/PT program encouraged  MEDICAL RECORDS reviewed from other provider(s) for past and current medical history pertinent to this complaint.  TKA: Continuation of conservative management vs. TKA: I reviewed anatomy of a total knee arthroplasty in laymen's terms, as well as typical postoperative recovery and possibly 6-12 months for maximal recovery, and possible need for rehabilitation stay after hospitalization. We also discussed risks, benefits, alternatives, and limitations of procedure with risks including but not limited to neurovascular damage, bleeding, infection, malalignment, chronic pain, failure of implants, osteolysis, loosening of implants, loss of motion, weakness, stiffness, instability, DVT, pulmonary embolus, death, stroke, complex regional pain syndrome, myocardial infarction, and need for additional procedures. Concept of substitution vs. replacement discussed.  No guarantees were given regarding results of surgery.  Patient verbalized understanding, and was given the opportunity to ask and have all questions answered today.       4/28/2021    Scribed for Dinesh Rahman MD by Maco Lezama.  04/28/21   17:23 EDT    I have personally performed the services described in this document as scribed by the above individual, and it is both accurate and complete.  Dinesh Rahman MD  4/29/2021  10:38 EDT

## 2021-04-28 NOTE — THERAPY TREATMENT NOTE
Outpatient Physical Therapy Ortho Treatment Note  [facility]     Patient Name: Madina Coughlin  : 1935  MRN: [id number]  Today's Date: 2021      Visit Date: 2021    Visit Dx:    ICD-10-CM ICD-9-CM   1. Chronic pain of left knee  M25.562 719.46    G89.29 338.29   2. Weakness of left lower extremity  R29.898 729.89   3. Gait difficulty  R26.9 781.2   4. Range of motion deficit  M25.60 719.50       Patient Active Problem List   Diagnosis   • HTN (hypertension)   • Hypothyroid   • IBS (irritable bowel syndrome)   • SVT (supraventricular tachycardia) (CMS/HCC)        Past Medical History:   Diagnosis Date   • Abnormal heart rate    • Disease of thyroid gland         No past surgical history on file.    PT Ortho     Row Name 21 1200       Subjective Pain    Pre-Treatment Pain Level  5 some improvement with medication  -JS      User Key  (r) = Recorded By, (t) = Taken By, (c) = Cosigned By    Initials Name Provider Type    Diane Ly, PT Physical Therapist                      PT Assessment/Plan     Row Name 21 1200          PT Assessment    Assessment Comments  Pt presents with increased symptoms of pain/buckling today, prompting use of Rollator walker for ambulation. Demonstrates tendency toward genu valgus position with sit <-> stand exercise though improvements following cueing for neutral positioning.  -JS        PT Plan    PT Plan Comments  Continue PT. Pt scheduled with Dr. Rahman for consultation.  -JS       User Key  (r) = Recorded By, (t) = Taken By, (c) = Cosigned By    Initials Name Provider Type    Diane Ly, PT Physical Therapist            OP Exercises     Row Name 21 1200             Subjective Comments    Subjective Comments  Reports increased knee pain & buckling this morning, causing pt to use Rollator walker ambulating into clinic.  -JS         Subjective Pain    Able to rate subjective pain?  yes  -JS      Pre-Treatment Pain Level  5 some improvement  with medication  -JS         Total Minutes    66564 - PT Therapeutic Exercise Minutes  35  -JS      62408 -  PT Neuromuscular Reeducation Minutes  8  -JS         Exercise 1    Exercise Name 1  nustep LE  -JS      Cueing 1  Verbal  -JS      Time 1  5 min  -JS      Additional Comments  L4  -JS         Exercise 2    Exercise Name 2  Quad set w/towel roll  -JS      Cueing 2  Verbal;Tactile  -JS      Time 2  15  -JS         Exercise 3    Exercise Name 3  SAQ  -JS      Cueing 3  Verbal;Tactile  -JS      Sets 3  2  -JS      Reps 3  10  -JS      Time 3  3sec  -JS      Additional Comments  3# laura  -JS         Exercise 4    Exercise Name 4  Glute sets  -JS      Cueing 4  Verbal;Tactile  -JS      Reps 4  15  -JS      Time 4  3sec  -JS         Exercise 5    Exercise Name 5  LAQ  -JS      Cueing 5  Verbal;Tactile;Demo  -JS      Reps 5  10  -JS      Time 5  3 sec  -JS      Additional Comments  3# bilateral  -JS         Exercise 6    Exercise Name 6  Sit to stand strategy, practice to reduce UE assist.  -JS      Cueing 6  Verbal;Demo cues to avoid genu valgus position  -JS      Time 6  2x5  -JS         Exercise 7    Exercise Name 7  HS curl  -JS      Cueing 7  Verbal;Demo  -JS      Reps 7  15  -JS      Additional Comments  3# laura  -JS         Exercise 8    Exercise Name 8  HS stretch seated  -JS      Cueing 8  Verbal;Demo  -JS      Reps 8  3  -JS      Time 8  20  -JS      Additional Comments  laura  -JS         Exercise 9    Exercise Name 9  HL hip abd w/ tband  -JS      Cueing 9  Verbal;Tactile  -JS      Sets 9  3 (1 set ea laura, L, R)  -JS      Reps 9  10 ea  -JS      Time 9  2-3 sec  -JS      Additional Comments  red  -JS         Exercise 10    Exercise Name 10  HL add w/ball  -JS      Cueing 10  Verbal;Tactile  -JS      Reps 10  15  -JS      Time 10  3sec  -JS         Exercise 11    Exercise Name 11  gait work Heel strike to toe off, 1 UE support  -JS      Cueing 11  Verbal;Demo  -JS      Reps 11  3 laps  -JS         Exercise 12  "   Exercise Name 12  DLS  -JS      Cueing 12  Verbal;Demo  -JS      Reps 12  4   -JS      Additional Comments  blue foam  -JS         Exercise 13    Exercise Name 13  1/2 tandem  -JS      Cueing 13  Verbal;Demo  -JS      Reps 13  4   -JS      Additional Comments  blue foam  -JS         Exercise 14    Exercise Name 14  Blue foam, double limb stance with head turn  -JS      Cueing 14  Verbal;Demo  -JS      Reps 14  3  -JS      Additional Comments  head turn  -JS        User Key  (r) = Recorded By, (t) = Taken By, (c) = Cosigned By    Initials Name Provider Type    Diane Ly, PT Physical Therapist                       PT OP Goals     Row Name 04/28/21 1200          PT Short Term Goals    STG Date to Achieve  04/09/21  -JS     STG 1  Pt will be independent with initial HEP.  -JS     STG 1 Progress  Met  -JS     STG 2  Pt will demonstrate good quad contraction without recruitment of gluteals, hamstrings, or gastroc.  -JS     STG 2 Progress  Met  -JS     STG 3  Pt will demonstrate heel strike to toe off gait with assistive device.  -JS     STG 3 Progress  Met  -JS        Long Term Goals    LTG Date to Achieve  05/09/21  -JS     LTG 1  Pt will be independent with knee \"Prehab\" program.  -JS     LTG 1 Progress  Progressing  -JS     LTG 2  Pt will demonstrate increased AROM by 5 degrees (from 15 degrees active extension initially).  -JS     LTG 2 Progress  Ongoing  -JS     LTG 3  Pt will score 49% or better in KOS indicating decrease in perceived functional disability.  -JS     LTG 3 Progress  Ongoing  -JS       User Key  (r) = Recorded By, (t) = Taken By, (c) = Cosigned By    Initials Name Provider Type    Diane Ly, PT Physical Therapist                         Time Calculation:   Start Time: 1200  Stop Time: 1243  Time Calculation (min): 43 min  Time Calculation- PT  Start Time: 1200  Stop Time: 1243  Time Calculation (min): 43 min  Timed Charges  99937 - PT Therapeutic Exercise Minutes: 35  60017 -  PT " Neuromuscular Reeducation Minutes: 8  Total Minutes  Timed Charges Total Minutes: 43   Total Minutes: 43  Therapy Charges for Today     Code Description Service Date Service Provider Modifiers Qty    71059806905 HC PT NEUROMUSC RE EDUCATION EA 15 MIN 4/28/2021 Diane Herbert, PT GP 1    [id number]  PT THER PROC EA 15 MIN 4/28/2021 Diane Herbert, PT GP 2                    iDane Herbert, TAURUS  4/28/2021

## 2021-05-05 ENCOUNTER — HOSPITAL ENCOUNTER (OUTPATIENT)
Dept: PHYSICAL THERAPY | Facility: HOSPITAL | Age: 86
Setting detail: THERAPIES SERIES
Discharge: HOME | End: 2021-05-05

## 2021-05-05 DIAGNOSIS — R26.9 GAIT DIFFICULTY: ICD-10-CM

## 2021-05-05 DIAGNOSIS — M25.562 CHRONIC PAIN OF LEFT KNEE: Primary | ICD-10-CM

## 2021-05-05 DIAGNOSIS — G89.29 CHRONIC PAIN OF LEFT KNEE: Primary | ICD-10-CM

## 2021-05-05 DIAGNOSIS — M25.60 RANGE OF MOTION DEFICIT: ICD-10-CM

## 2021-05-05 DIAGNOSIS — R29.898 WEAKNESS OF LEFT LOWER EXTREMITY: ICD-10-CM

## 2021-05-05 PROCEDURE — 97110 THERAPEUTIC EXERCISES: CPT

## 2021-05-12 ENCOUNTER — TELEPHONE (OUTPATIENT)
Dept: ORTHOPEDIC SURGERY | Facility: CLINIC | Age: 86
End: 2021-05-12

## 2021-05-12 NOTE — TELEPHONE ENCOUNTER
Patient is calling to say that she would like to go to [facility] afgter surgery.  She wanted you to know in case you need to place order for that.

## 2021-05-12 NOTE — TELEPHONE ENCOUNTER
This patient is calling a second time to say that she has been approved for surgery [facility] has not received a packet yet.  This is the second time she is calling.  Can you please call her to let her know what to expect?

## 2021-05-21 ENCOUNTER — TELEPHONE (OUTPATIENT)
Dept: ORTHOPEDIC SURGERY | Facility: CLINIC | Age: 86
End: 2021-05-21

## 2021-05-21 ENCOUNTER — TRANSCRIBE ORDERS (OUTPATIENT)
Dept: PREADMISSION TESTING | Facility: HOSPITAL | Age: 86
End: 2021-05-21

## 2021-05-21 NOTE — TELEPHONE ENCOUNTER
Called patient in regards to her [employer] message about insurance approving a skilled nursing facility.  She called [facility] and requested a bed. Will plan to dc there following surgery/observation.

## 2021-05-27 ENCOUNTER — PRE-ADMISSION TESTING (OUTPATIENT)
Dept: PREADMISSION TESTING | Facility: HOSPITAL | Age: 86
End: 2021-05-27

## 2021-05-27 VITALS
HEART RATE: 59 BPM | SYSTOLIC BLOOD PRESSURE: 148 MMHG | DIASTOLIC BLOOD PRESSURE: 82 MMHG | WEIGHT: 165 LBS | OXYGEN SATURATION: 97 % | RESPIRATION RATE: 20 BRPM | HEIGHT: 68 IN | TEMPERATURE: 97.8 F | BODY MASS INDEX: 25.01 KG/M2

## 2021-05-27 DIAGNOSIS — M17.12 ARTHRITIS OF LEFT KNEE: ICD-10-CM

## 2021-05-27 LAB
ANION GAP SERPL CALCULATED.3IONS-SCNC: 8.1 MMOL/L (ref 5–15)
BACTERIA UR QL AUTO: ABNORMAL /HPF
BILIRUB UR QL STRIP: NEGATIVE
BUN SERPL-MCNC: 11 MG/DL (ref 8–23)
BUN/CREAT SERPL: 14.7 (ref 7–25)
CALCIUM SPEC-SCNC: 9.1 MG/DL (ref 8.6–10.5)
CHLORIDE SERPL-SCNC: 105 MMOL/L (ref 98–107)
CLARITY UR: CLEAR
CO2 SERPL-SCNC: 26.9 MMOL/L (ref 22–29)
COLOR UR: YELLOW
CREAT SERPL-MCNC: 0.75 MG/DL (ref 0.57–1)
DEPRECATED RDW RBC AUTO: 42.9 FL (ref 37–54)
ERYTHROCYTE [DISTWIDTH] IN BLOOD BY AUTOMATED COUNT: 12.7 % (ref 12.3–15.4)
GFR SERPL CREATININE-BSD FRML MDRD: 74 ML/MIN/1.73
GLUCOSE SERPL-MCNC: 70 MG/DL (ref 65–99)
GLUCOSE UR STRIP-MCNC: NEGATIVE MG/DL
HCT VFR BLD AUTO: 39.1 % (ref 34–46.6)
HGB BLD-MCNC: 12.6 G/DL (ref 12–15.9)
HGB UR QL STRIP.AUTO: NEGATIVE
HYALINE CASTS UR QL AUTO: ABNORMAL /LPF
KETONES UR QL STRIP: NEGATIVE
LEUKOCYTE ESTERASE UR QL STRIP.AUTO: ABNORMAL
MCH RBC QN AUTO: 29.9 PG (ref 26.6–33)
MCHC RBC AUTO-ENTMCNC: 32.2 G/DL (ref 31.5–35.7)
MCV RBC AUTO: 92.7 FL (ref 79–97)
NITRITE UR QL STRIP: NEGATIVE
PH UR STRIP.AUTO: 5.5 [PH] (ref 5–8)
PLATELET # BLD AUTO: 150 10*3/MM3 (ref 140–450)
PMV BLD AUTO: 11.2 FL (ref 6–12)
POTASSIUM SERPL-SCNC: 3.7 MMOL/L (ref 3.5–5.2)
PROT UR QL STRIP: NEGATIVE
RBC # BLD AUTO: 4.22 10*6/MM3 (ref 3.77–5.28)
RBC # UR: ABNORMAL /HPF
REF LAB TEST METHOD: ABNORMAL
SODIUM SERPL-SCNC: 140 MMOL/L (ref 136–145)
SP GR UR STRIP: 1.01 (ref 1–1.03)
SQUAMOUS #/AREA URNS HPF: ABNORMAL /HPF
UROBILINOGEN UR QL STRIP: ABNORMAL
WBC # BLD AUTO: 5.42 10*3/MM3 (ref 3.4–10.8)
WBC UR QL AUTO: ABNORMAL /HPF

## 2021-05-27 PROCEDURE — 36415 COLL VENOUS BLD VENIPUNCTURE: CPT

## 2021-05-27 PROCEDURE — 80048 BASIC METABOLIC PNL TOTAL CA: CPT

## 2021-05-27 PROCEDURE — 87086 URINE CULTURE/COLONY COUNT: CPT

## 2021-05-27 PROCEDURE — 85027 COMPLETE CBC AUTOMATED: CPT

## 2021-05-27 PROCEDURE — A9270 NON-COVERED ITEM OR SERVICE: HCPCS | Performed by: ORTHOPAEDIC SURGERY

## 2021-05-27 PROCEDURE — 81001 URINALYSIS AUTO W/SCOPE: CPT

## 2021-05-27 PROCEDURE — 63710000001 MUPIROCIN 2 % OINTMENT: Performed by: ORTHOPAEDIC SURGERY

## 2021-05-27 RX ORDER — LEVOCETIRIZINE DIHYDROCHLORIDE 5 MG/1
5 TABLET, FILM COATED ORAL EVERY EVENING
COMMUNITY

## 2021-05-27 RX ORDER — LANOLIN ALCOHOL/MO/W.PET/CERES
3 CREAM (GRAM) TOPICAL NIGHTLY PRN
COMMUNITY

## 2021-05-27 RX ORDER — CLOTRIMAZOLE AND BETAMETHASONE DIPROPIONATE 10; .64 MG/G; MG/G
1 CREAM TOPICAL AS NEEDED
COMMUNITY
End: 2022-10-25

## 2021-05-27 RX ORDER — CHLORHEXIDINE GLUCONATE 500 MG/1
1 CLOTH TOPICAL
Status: ON HOLD | COMMUNITY
End: 2021-06-07

## 2021-05-27 RX ORDER — LIOTHYRONINE SODIUM 5 UG/1
7.5 TABLET ORAL NIGHTLY
COMMUNITY

## 2021-05-27 RX ORDER — LEVOTHYROXINE SODIUM 0.03 MG/1
25 TABLET ORAL 2 TIMES DAILY
COMMUNITY

## 2021-05-27 RX ORDER — MULTIPLE VITAMINS W/ MINERALS TAB 9MG-400MCG
1 TAB ORAL DAILY
COMMUNITY

## 2021-05-27 RX ORDER — CHLORHEXIDINE GLUCONATE 500 MG/1
1 CLOTH TOPICAL TAKE AS DIRECTED
Status: ACTIVE | OUTPATIENT
Start: 2021-05-27

## 2021-05-27 NOTE — DISCHARGE INSTRUCTIONS
Take the following medications the morning of surgery:    Liothyronine  Levothyroxine  Digestive enzymes      If you are on prescription narcotic pain medication to control your pain you may also take that medication the morning of surgery.    General Instructions:  • Do not eat solid food after midnight the night before surgery.  • You may drink clear liquids day of surgery but must stop at least one hour before your hospital arrival time.  • It is beneficial for you to have a clear drink that contains carbohydrates the day of surgery.  We suggest a 12 to 20 ounce bottle of Gatorade or Powerade for non-diabetic patients or a 12 to 20 ounce bottle of G2 or Powerade Zero for diabetic patients. (Pediatric patients, are not advised to drink a 12 to 20 ounce carbohydrate drink)    Clear liquids are liquids you can see through.  Nothing red in color.     Plain water                               Sports drinks  Sodas                                   Gelatin (Jell-O)  Fruit juices without pulp such as white grape juice and apple juice  Popsicles that contain no fruit or yogurt  Tea or coffee (no cream or milk added)  Gatorade / Powerade  G2 / Powerade Zero    • Infants may have breast milk up to four hours before surgery.  • Infants drinking formula may drink formula up to six hours before surgery.   • Patients who avoid smoking, chewing tobacco and alcohol for 4 weeks prior to surgery have a reduced risk of post-operative complications.  Quit smoking as many days before surgery as you can.  • Do not smoke, use chewing tobacco or drink alcohol the day of surgery.   • If applicable bring your C-PAP/ BI-PAP machine.  • Bring any papers given to you in the doctor’s office.  • Wear clean comfortable clothes.  • Do not wear contact lenses, false eyelashes or make-up.  Bring a case for your glasses.   • Bring crutches or walker if applicable.  • Remove all piercings.  Leave jewelry and any other valuables at home.  • Hair  extensions with metal clips must be removed prior to surgery.  • The Pre-Admission Testing nurse will instruct you to bring medications if unable to obtain an accurate list in Pre-Admission Testing.        If you were given a blood bank ID arm band remember to bring it with you the day of surgery.    Preventing a Surgical Site Infection:  • For 2 to 3 days before surgery, avoid shaving with a razor because the razor can irritate skin and make it easier to develop an infection.    • Any areas of open skin can increase the risk of a post-operative wound infection by allowing bacteria to enter and travel throughout the body.  Notify your surgeon if you have any skin wounds / rashes even if it is not near the expected surgical site.  The area will need assessed to determine if surgery should be delayed until it is healed.  • The night prior to surgery shower using a fresh bar of anti-bacterial soap (such as Dial) and clean washcloth.  Sleep in a clean bed with clean clothing.  Do not allow pets to sleep with you.  • Shower on the morning of surgery using a fresh bar of anti-bacterial soap (such as Dial) and clean washcloth.  Dry with a clean towel and dress in clean clothing.  • Ask your surgeon if you will be receiving antibiotics prior to surgery.  • Make sure you, your family, and all healthcare providers clean their hands with soap and water or an alcohol based hand  before caring for you or your wound.    Day of surgery:6/7/2021  1030   Your arrival time is approximately two hours before your scheduled surgery time.  Upon arrival, a Pre-op nurse and Anesthesiologist will review your health history, obtain vital signs, and answer questions you may have.  The only belongings needed at this time will be a list of your home medications and if applicable your C-PAP/BI-PAP machine.  A Pre-op nurse will start an IV and you may receive medication in preparation for surgery, including something to help you relax.      Please be aware that surgery does come with discomfort.  We want to make every effort to control your discomfort so please discuss any uncontrolled symptoms with your nurse.   Your doctor will most likely have prescribed pain medications.      If you are going home after surgery you will receive individualized written care instructions before being discharged.  A responsible adult must drive you to and from the hospital on the day of your surgery and stay with you for 24 hours.  Discharge prescriptions can be filled by the hospital pharmacy during regular pharmacy hours.  If you are having surgery late in the day/evening your prescription may be e-prescribed to your pharmacy.  Please verify your pharmacy hours or chose a 24 hour pharmacy to avoid not having access to your prescription because your pharmacy has closed for the day.    If you are staying overnight following surgery, you will be transported to your hospital room following the recovery period.  [facility] has all private rooms.    If you have any questions please call Pre-Admission Testing at [phone].  Deductibles and co-payments are collected on the day of service. Please be prepared to pay the required co-pay, deductible or deposit on the day of service as defined by your plan.    Patient Education for Self-Quarantine Process    Following your COVID testing, we strongly recommend that you do not leave your home after you have been tested for COVID except to get medical care. This includes not going to work, school or to public areas.  If this is not possible for you to do please limit your activities to only required outings.  Be sure to wear a mask when you are with other people, practice social distancing and wash your hands frequently.      The following items provide additional details to keep you safe.  • Wash your hands with soap and water frequently for at least 20 seconds.   • Avoid touching your eyes, nose and mouth  with unwashed hands.  • Do not share anything - utensils, towels, food from the same bowl.   • Have your own utensils, drinking glass, dishes, towels and bedding.   • Do not have visitors.   • Do use FaceTime to stay in touch with family and friends.  • You should stay in a specific room away from others if possible.   • Stay at least 6 feet away from others in the home if you cannot have a dedicated room to yourself.   • Do not snuggle with your pet. While the CDC says there is no evidence that pets can spread COVID-19 or be infected from humans, it is probably best to avoid “petting, snuggling, being kissed or licked and sharing food (during self-quarantine)”, according to the CDC.   • Sanitize household surfaces daily. Include all high touch areas (door handles, light switches, phones, countertops, etc.)  • Do not share a bathroom with others, if possible.   • Wear a mask around others in your home if you are unable to stay in a separate room or 6 feet apart. If  you are unable to wear a mask, have your family member wear a mask if they must be within 6 feet of you.   Call your surgeon immediately if you experience any of the following symptoms:  • Sore Throat  • Shortness of Breath or difficulty breathing  • Cough  • Chills  • Body soreness or muscle pain  • Headache  • Fever  • New loss of taste or smell  • Do not arrive for your surgery ill.  Your procedure will need to be rescheduled to another time.  You will need to call your physician before the day of surgery to avoid any unnecessary exposure to hospital staff as well as other patients.    CHLORHEXIDINE CLOTH INSTRUCTIONS  The morning of surgery follow these instructions using the Chlorhexidine cloths you've been given.  These steps reduce bacteria on the body.  Do not use the cloths near your eyes, ears mouth, genitalia or on open wounds.  Throw the cloths away after use but do not try to flush them down a toilet.      • Open and remove one cloth at a  time from the package.    • Leave the cloth unfolded and begin the bathing.  • Massage the skin with the cloths using gentle pressure to remove bacteria.  Do not scrub harshly.   • Follow the steps below with one 2% CHG cloth per area (6 total cloths).  • One cloth for neck, shoulders and chest.  • One cloth for both arms, hands, fingers and underarms (do underarms last).  • One cloth for the abdomen followed by groin.  • One cloth for right leg and foot including between the toes.  • One cloth for left leg and foot including between the toes.  • The last cloth is to be used for the back of the neck, back and buttocks.    Allow the CHG to air dry 3 minutes on the skin which will give it time to work and decrease the chance of irritation.  The skin may feel sticky until it is dry.  Do not rinse with water or any other liquid or you will lose the beneficial effects of the CHG.  If mild skin irritation occurs, do rinse the skin to remove the CHG.  Report this to the nurse at time of admission.  Do not apply lotions, creams, ointments, deodorants or perfumes after using the clothes. Dress in clean clothes before coming to the hospital.    BACTROBAN NASAL OINTMENT  There are many germs normally in your nose. Bactroban is an ointment that will help reduce these germs. Please follow these instructions for Bactroban use:      __1__The day before surgery in the morning  Date_6/6/2021_______    _2___The day before surgery in the evening              Date_6/6/2021_______    __3__The day of surgery in the morning    Date_6/7/2021_______    **Squirt ½ package of Bactroban Ointment onto a cotton applicator and apply to inside of 1st nostril.  Squirt the remaining Bactroban and apply to the inside of the other nostril.    PERIDEX- ORAL:  Use only if your surgeon has ordered  Use the night before and morning of surgery - Swish, gargle, and spit - do not swallow.

## 2021-05-29 LAB — BACTERIA SPEC AEROBE CULT: NORMAL

## 2021-06-01 ENCOUNTER — OFFICE VISIT (OUTPATIENT)
Dept: ORTHOPEDIC SURGERY | Facility: CLINIC | Age: 86
End: 2021-06-01

## 2021-06-01 VITALS — TEMPERATURE: 96.4 F | BODY MASS INDEX: 25.01 KG/M2 | WEIGHT: 165 LBS | HEIGHT: 68 IN

## 2021-06-01 DIAGNOSIS — M17.12 ARTHRITIS OF LEFT KNEE: Primary | ICD-10-CM

## 2021-06-01 PROCEDURE — S0260 H&P FOR SURGERY: HCPCS | Performed by: NURSE PRACTITIONER

## 2021-06-01 NOTE — H&P (VIEW-ONLY)
"   History & Physical       Patient: Madina Coughlin  YOB: 1935  Medical Record Number: [id number]  Wt Readings from Last 3 Encounters:   06/01/21 74.8 kg (165 lb)   05/27/21 74.8 kg (165 lb)   04/28/21 73.9 kg (163 lb)     Ht Readings from Last 3 Encounters:   06/01/21 172.7 cm (68\")   05/27/21 172.7 cm (68\")   04/28/21 172.7 cm (68\")     Body mass index is 25.09 kg/m².  Facility age limit for growth percentiles is 20 years.    Surgeon:  Dr. Dinesh Rahman    Chief Complaints:   Chief Complaint   Patient presents with   • Left Knee - Pre-op Exam     Surgery:   LEFT TOTAL KNEE ARTHROPLASTY WITH CORINNE NAVIGATION    History of Present Illness: 85 y.o. female presents with   Chief Complaint   Patient presents with   • Left Knee - Pre-op Exam   . Chronic symptoms have been progressively worsening despite more conservative treatment measures including medications OTC and prescription, cortisone injections and or gel injections, and physical therapy.  Symptoms are associated with ability to move, exercise, and perform activities of daily living.  Symptoms are aggravated by weight bearing and ROM necessary for activities of daily living.   Symptoms improve with rest, ice and elevation only minimally.      Allergies: No Known Allergies    Medications:   Home Medications:  Current Outpatient Medications on File Prior to Visit   Medication Sig   • azelastine (ASTELIN) 0.1 % nasal spray USE 1 TO 2 PUFFS IN EACH NOSTRIL AT BEDTIME   • Chlorhexidine Gluconate Cloth 2 % pads Apply 1 application topically. USE AS DIRECTED PREOP   • clotrimazole-betamethasone (LOTRISONE) 1-0.05 % cream Apply 1 application topically to the appropriate area as directed As Needed.   • Digestive Enzymes (DIGESTIVE ENZYME PO) Take 2 each by mouth 2 (Two) Times a Day.   • levocetirizine (XYZAL) 5 MG tablet Take 5 mg by mouth Every Evening.   • levothyroxine (SYNTHROID, LEVOTHROID) 25 MCG tablet Take 25 mcg by mouth 2 (Two) Times a Day. "   • liothyronine (CYTOMEL) 5 MCG tablet Take 10 mcg by mouth Every Morning.   • liothyronine (CYTOMEL) 5 MCG tablet Take 7.5 mcg by mouth Every Night.   • melatonin 3 MG tablet Take 3 mg by mouth At Night As Needed for Sleep.   • metoprolol succinate XL (TOPROL-XL) 50 MG 24 hr tablet Take 50 mg by mouth Every Night.   • multivitamin (MULTI-VITAMIN PO) Take 1 tablet by mouth Daily.   • multivitamin with minerals (Multivitamin Women 50+) tablet tablet Take 1 tablet by mouth Daily.   • mupirocin (BACTROBAN) 2 % nasal ointment 1 application into the nostril(s) as directed by provider. USE AS DIRECTED PREOP   • Pregnenolone Micronized (PREGNENOLONE PO) Take 10 mg by mouth Daily.   • Probiotic Product (PROBIOTIC PO) Take 1 capsule by mouth Daily.     Current Facility-Administered Medications on File Prior to Visit   Medication   • Chlorhexidine Gluconate Cloth 2 % pads 1 each     Current Medications:  Scheduled Meds:  Continuous Infusions:No current facility-administered medications for this visit.    PRN Meds:.    I have reviewed the patient's medical history in detail and updated the computerized patient record.  Review and summarization of old records include:    Past Medical History:   Diagnosis Date   • Abnormal heart rate     SVT   • Arthritis    • Disease of thyroid gland    • History of jock itch     uses clotrimazole cream   • Hypertension    • Knee pain    • Mild dietary indigestion     TAKES DIGESTIVE ENZYMES AND IF SHE DOESN'T HAS A LOT OF GAS AND BLOATING         Past Surgical History:   Procedure Laterality Date   • CATARACT EXTRACTION EXTRACAPSULAR W/ INTRAOCULAR LENS IMPLANTATION Bilateral    • COLONOSCOPY     • MASTOID SURGERY      as a child   • TONSILLECTOMY          Social History     Occupational History   • Not on file   Tobacco Use   • Smoking status: Never Smoker   • Smokeless tobacco: Never Used   Vaping Use   • Vaping Use: Never used   Substance and Sexual Activity   • Alcohol use: Yes      "Comment: couple yearly   • Drug use: Never   • Sexual activity: Not on file      Social History     Social History Narrative   • Not on file        Family History   Problem Relation Age of Onset   • Coronary artery disease Paternal Grandfather    • Coronary artery disease Maternal Grandfather    • Breast cancer Maternal Aunt    • Lymphoma Brother    • Ovarian cancer Neg Hx    • Uterine cancer Neg Hx    • Colon cancer Neg Hx    • Malig Hyperthermia Neg Hx        ROS: 14 point review of systems was performed and was negative except for documented findings in HPI and today's encounter.       Constitutional:  Denies fever, shaking or chills   Eyes:  Denies change in visual acuity   HENT:  Denies nasal congestion or sore throat   Respiratory:  Denies cough or shortness of breath   Cardiovascular:  Denies chest pain or severe LE edema   GI:  Denies abdominal pain, nausea, vomiting, bloody stools or diarrhea   Musculoskeletal:  Denies numbness tingling or loss of motor function except as outlined above in history of present illness.  : Denies painful urination or hematuria  Integument:  Denies rash, lesion or ulceration   Neurologic:  Denies headache or focal weakness  Endocrine:  Denies lymphadenopathy  Psych:  Denies confusion or change in mental status   Hem:  Denies active bleeding    Physical Exam: 85 y.o. female  Wt Readings from Last 3 Encounters:   06/01/21 74.8 kg (165 lb)   05/27/21 74.8 kg (165 lb)   04/28/21 73.9 kg (163 lb)     Ht Readings from Last 3 Encounters:   06/01/21 172.7 cm (68\")   05/27/21 172.7 cm (68\")   04/28/21 172.7 cm (68\")     Body mass index is 25.09 kg/m².  Facility age limit for growth percentiles is 20 years.  Vitals:    06/01/21 1033   Temp: 96.4 °F (35.8 °C)       Vital signs reviewed.   General Appearance:    Alert, cooperative, in no acute distress                  Eyes: conjunctiva clear  ENT: external ears and nose atraumatic  CV: no peripheral edema  Resp: normal respiratory " effort  Skin: no rashes or wounds; normal turgor  Psych: mood and affect appropriate  Lymph: no nodes appreciated  Neuro: gross sensation intact  Vascular:  Palpable peripheral pulse in noted extremity  Musculoskeletal Extremities: KNEE Exam: medial and lateral joint line tenderness with crepitation, synovitis, swelling, and joint effusion left knee.        Diagnostic Tests:  No results found for this or any previous visit.  Lab Results   Component Value Date    GLUCOSE 70 05/27/2021    CALCIUM 9.1 05/27/2021     05/27/2021    K 3.7 05/27/2021    CO2 26.9 05/27/2021     05/27/2021    BUN 11 05/27/2021    CREATININE 0.75 05/27/2021    EGFRIFNONA 74 05/27/2021    BCR 14.7 05/27/2021    ANIONGAP 8.1 05/27/2021       EKG:    EKG in chart; cardiac clearance Dr. Smith 5/6/2021    I have reviewed all the lab & EKG results. There are some abnormalities that are not critical to the patient's health, but I would like to discuss these in person at an office appointment.     Imaging was done previously in the office, viewed images and discussed with the patient:    Indication: pain related symptoms,  Assessment:  Patient Active Problem List   Diagnosis   • HTN (hypertension)   • Hypothyroid   • IBS (irritable bowel syndrome)   • SVT (supraventricular tachycardia) (CMS/HCC)   • Arthritis of left knee       Plan:  Reviewed anatomy of a total joint arthroplasty in laymen's terms, as well as typical postoperative recovery and possibly 6-12 months for maximal recovery, and possible need for rehabilitation stay after hospitalization. We also discussed risks, benefits, alternatives, and limitations of procedure with risks including but not limited to neurovascular damage, bleeding, infection, malalignment, chronic pian, failure of implants, osteolysis, loosening of implants, loss of motion, weakness, stiffness, instability, DVT, pulmonary embolus, death, stroke, complex regional pain syndrome, myocardial infarction, and  need for additional procedures. Concept of substitution vs. replacement discussed.  No guarantees were given regarding results of surgery.      Madina Coughlin was given the opportunity to ask and have all questions answered today.  The patient voiced understanding of the risks, benefits, and alternative forms of treatment that were discussed and the patient consents to proceed with surgery.     PCP clearance Dr. Russell 5/7/2021    Skin breakdown? WNL  Metal allergy? No  DVT Risk Factors: no significant risk factors    DVT Prophylaxis:  Aspirin    Discharge Plan: POD 1 to [facility]- nia dennis    To be updated    Date: 6/1/2021  MIA Santiago

## 2021-06-01 NOTE — PROGRESS NOTES
"   History & Physical       Patient: Madina Coughlin  YOB: 1935  Medical Record Number: [id number]  Wt Readings from Last 3 Encounters:   06/01/21 74.8 kg (165 lb)   05/27/21 74.8 kg (165 lb)   04/28/21 73.9 kg (163 lb)     Ht Readings from Last 3 Encounters:   06/01/21 172.7 cm (68\")   05/27/21 172.7 cm (68\")   04/28/21 172.7 cm (68\")     Body mass index is 25.09 kg/m².  Facility age limit for growth percentiles is 20 years.    Surgeon:  Dr. Dinesh Rahman    Chief Complaints:   Chief Complaint   Patient presents with   • Left Knee - Pre-op Exam     Surgery:   LEFT TOTAL KNEE ARTHROPLASTY WITH CORINNE NAVIGATION    History of Present Illness: 85 y.o. female presents with   Chief Complaint   Patient presents with   • Left Knee - Pre-op Exam   . Chronic symptoms have been progressively worsening despite more conservative treatment measures including medications OTC and prescription, cortisone injections and or gel injections, and physical therapy.  Symptoms are associated with ability to move, exercise, and perform activities of daily living.  Symptoms are aggravated by weight bearing and ROM necessary for activities of daily living.   Symptoms improve with rest, ice and elevation only minimally.      Allergies: No Known Allergies    Medications:   Home Medications:  Current Outpatient Medications on File Prior to Visit   Medication Sig   • azelastine (ASTELIN) 0.1 % nasal spray USE 1 TO 2 PUFFS IN EACH NOSTRIL AT BEDTIME   • Chlorhexidine Gluconate Cloth 2 % pads Apply 1 application topically. USE AS DIRECTED PREOP   • clotrimazole-betamethasone (LOTRISONE) 1-0.05 % cream Apply 1 application topically to the appropriate area as directed As Needed.   • Digestive Enzymes (DIGESTIVE ENZYME PO) Take 2 each by mouth 2 (Two) Times a Day.   • levocetirizine (XYZAL) 5 MG tablet Take 5 mg by mouth Every Evening.   • levothyroxine (SYNTHROID, LEVOTHROID) 25 MCG tablet Take 25 mcg by mouth 2 (Two) Times a Day. "   • liothyronine (CYTOMEL) 5 MCG tablet Take 10 mcg by mouth Every Morning.   • liothyronine (CYTOMEL) 5 MCG tablet Take 7.5 mcg by mouth Every Night.   • melatonin 3 MG tablet Take 3 mg by mouth At Night As Needed for Sleep.   • metoprolol succinate XL (TOPROL-XL) 50 MG 24 hr tablet Take 50 mg by mouth Every Night.   • multivitamin (MULTI-VITAMIN PO) Take 1 tablet by mouth Daily.   • multivitamin with minerals (Multivitamin Women 50+) tablet tablet Take 1 tablet by mouth Daily.   • mupirocin (BACTROBAN) 2 % nasal ointment 1 application into the nostril(s) as directed by provider. USE AS DIRECTED PREOP   • Pregnenolone Micronized (PREGNENOLONE PO) Take 10 mg by mouth Daily.   • Probiotic Product (PROBIOTIC PO) Take 1 capsule by mouth Daily.     Current Facility-Administered Medications on File Prior to Visit   Medication   • Chlorhexidine Gluconate Cloth 2 % pads 1 each     Current Medications:  Scheduled Meds:  Continuous Infusions:No current facility-administered medications for this visit.    PRN Meds:.    I have reviewed the patient's medical history in detail and updated the computerized patient record.  Review and summarization of old records include:    Past Medical History:   Diagnosis Date   • Abnormal heart rate     SVT   • Arthritis    • Disease of thyroid gland    • History of jock itch     uses clotrimazole cream   • Hypertension    • Knee pain    • Mild dietary indigestion     TAKES DIGESTIVE ENZYMES AND IF SHE DOESN'T HAS A LOT OF GAS AND BLOATING         Past Surgical History:   Procedure Laterality Date   • CATARACT EXTRACTION EXTRACAPSULAR W/ INTRAOCULAR LENS IMPLANTATION Bilateral    • COLONOSCOPY     • MASTOID SURGERY      as a child   • TONSILLECTOMY          Social History     Occupational History   • Not on file   Tobacco Use   • Smoking status: Never Smoker   • Smokeless tobacco: Never Used   Vaping Use   • Vaping Use: Never used   Substance and Sexual Activity   • Alcohol use: Yes      "Comment: couple yearly   • Drug use: Never   • Sexual activity: Not on file      Social History     Social History Narrative   • Not on file        Family History   Problem Relation Age of Onset   • Coronary artery disease Paternal Grandfather    • Coronary artery disease Maternal Grandfather    • Breast cancer Maternal Aunt    • Lymphoma Brother    • Ovarian cancer Neg Hx    • Uterine cancer Neg Hx    • Colon cancer Neg Hx    • Malig Hyperthermia Neg Hx        ROS: 14 point review of systems was performed and was negative except for documented findings in HPI and today's encounter.       Constitutional:  Denies fever, shaking or chills   Eyes:  Denies change in visual acuity   HENT:  Denies nasal congestion or sore throat   Respiratory:  Denies cough or shortness of breath   Cardiovascular:  Denies chest pain or severe LE edema   GI:  Denies abdominal pain, nausea, vomiting, bloody stools or diarrhea   Musculoskeletal:  Denies numbness tingling or loss of motor function except as outlined above in history of present illness.  : Denies painful urination or hematuria  Integument:  Denies rash, lesion or ulceration   Neurologic:  Denies headache or focal weakness  Endocrine:  Denies lymphadenopathy  Psych:  Denies confusion or change in mental status   Hem:  Denies active bleeding    Physical Exam: 85 y.o. female  Wt Readings from Last 3 Encounters:   06/01/21 74.8 kg (165 lb)   05/27/21 74.8 kg (165 lb)   04/28/21 73.9 kg (163 lb)     Ht Readings from Last 3 Encounters:   06/01/21 172.7 cm (68\")   05/27/21 172.7 cm (68\")   04/28/21 172.7 cm (68\")     Body mass index is 25.09 kg/m².  Facility age limit for growth percentiles is 20 years.  Vitals:    06/01/21 1033   Temp: 96.4 °F (35.8 °C)       Vital signs reviewed.   General Appearance:    Alert, cooperative, in no acute distress                  Eyes: conjunctiva clear  ENT: external ears and nose atraumatic  CV: no peripheral edema  Resp: normal respiratory " effort  Skin: no rashes or wounds; normal turgor  Psych: mood and affect appropriate  Lymph: no nodes appreciated  Neuro: gross sensation intact  Vascular:  Palpable peripheral pulse in noted extremity  Musculoskeletal Extremities: KNEE Exam: medial and lateral joint line tenderness with crepitation, synovitis, swelling, and joint effusion left knee.        Diagnostic Tests:  No results found for this or any previous visit.  Lab Results   Component Value Date    GLUCOSE 70 05/27/2021    CALCIUM 9.1 05/27/2021     05/27/2021    K 3.7 05/27/2021    CO2 26.9 05/27/2021     05/27/2021    BUN 11 05/27/2021    CREATININE 0.75 05/27/2021    EGFRIFNONA 74 05/27/2021    BCR 14.7 05/27/2021    ANIONGAP 8.1 05/27/2021       EKG:    EKG in chart; cardiac clearance Dr. Smith 5/6/2021    I have reviewed all the lab & EKG results. There are some abnormalities that are not critical to the patient's health, but I would like to discuss these in person at an office appointment.     Imaging was done previously in the office, viewed images and discussed with the patient:    Indication: pain related symptoms,  Assessment:  Patient Active Problem List   Diagnosis   • HTN (hypertension)   • Hypothyroid   • IBS (irritable bowel syndrome)   • SVT (supraventricular tachycardia) (CMS/HCC)   • Arthritis of left knee       Plan:  Reviewed anatomy of a total joint arthroplasty in laymen's terms, as well as typical postoperative recovery and possibly 6-12 months for maximal recovery, and possible need for rehabilitation stay after hospitalization. We also discussed risks, benefits, alternatives, and limitations of procedure with risks including but not limited to neurovascular damage, bleeding, infection, malalignment, chronic pian, failure of implants, osteolysis, loosening of implants, loss of motion, weakness, stiffness, instability, DVT, pulmonary embolus, death, stroke, complex regional pain syndrome, myocardial infarction, and  need for additional procedures. Concept of substitution vs. replacement discussed.  No guarantees were given regarding results of surgery.      Madina Coughlin was given the opportunity to ask and have all questions answered today.  The patient voiced understanding of the risks, benefits, and alternative forms of treatment that were discussed and the patient consents to proceed with surgery.     PCP clearance Dr. Russell 5/7/2021    Skin breakdown? WNL  Metal allergy? No  DVT Risk Factors: no significant risk factors    DVT Prophylaxis:  Aspirin    Discharge Plan: POD 1 to [facility]- nia dennis    To be updated    Date: 6/1/2021  MIA Santiago

## 2021-06-02 NOTE — CASE MANAGEMENT/SOCIAL WORK
Pre Op Ortho Assessment    [facility]     Patient Name: Madina Coughlin  MRN: [id number]  Today's Date: 6/2/2021        PRE-OPERATIVE ORTHOPEDIC ASSESSMENT     Row Name 06/02/21 1344       Question    What is your age group?  0    Gender?  1    How far on average can you walk? (a block is 200 meters)  2    Which gait aid do you use? (more often than not)  1    Do you use community supports: (home help, meals on wheels, district nursing)  1    Will you live with someone who can care for you after your operation?  0    Your Score (out of 12)  5       Discharge Disposition/Planning:    Discussed the predicted discharge disposition needed based on RAPT Assessment with the patient  Yes    Patient Selected Discharge Disposition:  SNF    Post-operative Caregiver Name and Phone Number  -- She states she has no one that can stay with her       Subacute Inpatient Rehabilitation: Complete this section only if planning inpatient services at a sub-acute facility.     Subacute Facility Preferred  Other *see comments [facility]    Requires pre-certification for inpatient rehabilitation services?  No She would need to qualify to stay 3 days in the hospital and qualify for SNF    If choosing inpatient services at an Acute or Subacute Facility please list a subsequent back-up plan (in case the patient fails to qualify for inpatient rehabilitation).  Other *see comments She does not really have a back up/ I suggested HH and maybe friends to check in on her       Home Equipment    Does patient have a walker for home use?  Yes    Does patient have a 3 in 1 commode for home use?  No    Does patient have a shower chair for home use?  Yes    Does patient have an elevated commode seat for home use?  Yes    Does patient have a cane for home use?  Yes    Is there any other medical equipment in the home?  No       Pre-Operative Class Attendance    Attended or scheduled to attend the pre-operative class within 1 year of total joint  replacement?  Yes       Patient Education    Expected time of discharge discussed  Yes    Encouraged to attend pre-operative class  Yes    Education regarding predicted discharge disposition based on RAPT score  Yes    Patient receptive and voiced understanding of information given  Yes        She has taken the class and she has no one to stay with her after surgery.  She would like to go to [facility] if she qualifies, if not she would need  services.  She lives in a building and has 2 flights of stairs to her apartment but it does have an elevator.  She uses a cane now and can borrow from a friend the other DME that is needed.    Shannon Epley, RN

## 2021-06-04 ENCOUNTER — LAB (OUTPATIENT)
Dept: LAB | Facility: HOSPITAL | Age: 86
End: 2021-06-04

## 2021-06-04 PROCEDURE — C9803 HOPD COVID-19 SPEC COLLECT: HCPCS

## 2021-06-04 PROCEDURE — U0005 INFEC AGEN DETEC AMPLI PROBE: HCPCS

## 2021-06-04 PROCEDURE — U0004 COV-19 TEST NON-CDC HGH THRU: HCPCS

## 2021-06-05 LAB — SARS-COV-2 ORF1AB RESP QL NAA+PROBE: NOT DETECTED

## 2021-06-07 ENCOUNTER — APPOINTMENT (OUTPATIENT)
Dept: GENERAL RADIOLOGY | Facility: HOSPITAL | Age: 86
End: 2021-06-07

## 2021-06-07 ENCOUNTER — ANESTHESIA (OUTPATIENT)
Dept: PERIOP | Facility: HOSPITAL | Age: 86
End: 2021-06-07

## 2021-06-07 ENCOUNTER — HOSPITAL ENCOUNTER (OUTPATIENT)
Facility: HOSPITAL | Age: 86
Discharge: NURSING, REHAB OR LONG-TERM CARE | End: 2021-06-08
Attending: ORTHOPAEDIC SURGERY | Admitting: ORTHOPAEDIC SURGERY

## 2021-06-07 ENCOUNTER — ANESTHESIA EVENT (OUTPATIENT)
Dept: PERIOP | Facility: HOSPITAL | Age: 86
End: 2021-06-07

## 2021-06-07 DIAGNOSIS — M17.12 ARTHRITIS OF LEFT KNEE: ICD-10-CM

## 2021-06-07 PROCEDURE — 25010000002 NEOSTIGMINE 5 MG/10ML SOLUTION: Performed by: NURSE ANESTHETIST, CERTIFIED REGISTERED

## 2021-06-07 PROCEDURE — A9270 NON-COVERED ITEM OR SERVICE: HCPCS | Performed by: NURSE PRACTITIONER

## 2021-06-07 PROCEDURE — 63710000001 DOCUSATE SODIUM 100 MG CAPSULE: Performed by: NURSE PRACTITIONER

## 2021-06-07 PROCEDURE — 25010000002 FENTANYL CITRATE (PF) 50 MCG/ML SOLUTION: Performed by: NURSE ANESTHETIST, CERTIFIED REGISTERED

## 2021-06-07 PROCEDURE — 25010000002 SUCCINYLCHOLINE PER 20 MG: Performed by: NURSE ANESTHETIST, CERTIFIED REGISTERED

## 2021-06-07 PROCEDURE — C1889 IMPLANT/INSERT DEVICE, NOC: HCPCS | Performed by: ORTHOPAEDIC SURGERY

## 2021-06-07 PROCEDURE — 20985 CPTR-ASST DIR MS PX: CPT | Performed by: ORTHOPAEDIC SURGERY

## 2021-06-07 PROCEDURE — 63710000001 METOPROLOL SUCCINATE XL 50 MG TABLET SUSTAINED-RELEASE 24 HOUR: Performed by: NURSE PRACTITIONER

## 2021-06-07 PROCEDURE — 25010000002 PROPOFOL 10 MG/ML EMULSION: Performed by: NURSE ANESTHETIST, CERTIFIED REGISTERED

## 2021-06-07 PROCEDURE — 63710000001 HYDROCODONE-ACETAMINOPHEN 7.5-325 MG TABLET: Performed by: NURSE PRACTITIONER

## 2021-06-07 PROCEDURE — 25010000002 CLONIDINE PER 1 MG: Performed by: ORTHOPAEDIC SURGERY

## 2021-06-07 PROCEDURE — C1713 ANCHOR/SCREW BN/BN,TIS/BN: HCPCS | Performed by: ORTHOPAEDIC SURGERY

## 2021-06-07 PROCEDURE — G0378 HOSPITAL OBSERVATION PER HR: HCPCS

## 2021-06-07 PROCEDURE — 25010000003 CEFAZOLIN IN DEXTROSE 2-4 GM/100ML-% SOLUTION: Performed by: ORTHOPAEDIC SURGERY

## 2021-06-07 PROCEDURE — 27447 TOTAL KNEE ARTHROPLASTY: CPT | Performed by: ORTHOPAEDIC SURGERY

## 2021-06-07 PROCEDURE — 25010000002 ROPIVACAINE PER 1 MG: Performed by: ORTHOPAEDIC SURGERY

## 2021-06-07 PROCEDURE — 25010000003 CEFAZOLIN IN DEXTROSE 2-4 GM/100ML-% SOLUTION: Performed by: NURSE PRACTITIONER

## 2021-06-07 PROCEDURE — 25010000002 HYDROMORPHONE PER 4 MG: Performed by: NURSE ANESTHETIST, CERTIFIED REGISTERED

## 2021-06-07 PROCEDURE — 25010000002 DEXAMETHASONE PER 1 MG: Performed by: NURSE ANESTHETIST, CERTIFIED REGISTERED

## 2021-06-07 PROCEDURE — 63710000001 MUPIROCIN 2 % OINTMENT: Performed by: NURSE PRACTITIONER

## 2021-06-07 PROCEDURE — 63710000001 POLYETHYLENE GLYCOL 17 G PACK: Performed by: NURSE PRACTITIONER

## 2021-06-07 PROCEDURE — 25010000002 KETOROLAC TROMETHAMINE PER 15 MG: Performed by: ORTHOPAEDIC SURGERY

## 2021-06-07 PROCEDURE — 25010000002 ONDANSETRON PER 1 MG: Performed by: NURSE ANESTHETIST, CERTIFIED REGISTERED

## 2021-06-07 PROCEDURE — 25010000002 EPINEPHRINE 30 MG/30ML SOLUTION: Performed by: ORTHOPAEDIC SURGERY

## 2021-06-07 PROCEDURE — 73560 X-RAY EXAM OF KNEE 1 OR 2: CPT

## 2021-06-07 PROCEDURE — C1776 JOINT DEVICE (IMPLANTABLE): HCPCS | Performed by: ORTHOPAEDIC SURGERY

## 2021-06-07 DEVICE — SMARTSET HIGH PERFORMANCE MV MEDIUM VISCOSITY BONE CEMENT 40G
Type: IMPLANTABLE DEVICE | Site: KNEE | Status: FUNCTIONAL
Brand: SMARTSET

## 2021-06-07 DEVICE — ARISTA AH ABSORBABLE HEMOSTATIC PARTICLES
Type: IMPLANTABLE DEVICE | Site: KNEE | Status: FUNCTIONAL
Brand: ARISTA™ AH

## 2021-06-07 DEVICE — IMPLANTABLE DEVICE: Type: IMPLANTABLE DEVICE | Site: KNEE | Status: FUNCTIONAL

## 2021-06-07 DEVICE — P.F.C. SIGMA TIBIAL TRAY FIXED BEARING MODULAR COCR 3 CEMENTED
Type: IMPLANTABLE DEVICE | Site: KNEE | Status: FUNCTIONAL
Brand: P.F.C. SIGMA

## 2021-06-07 DEVICE — DEV CONTRL TISS STRATAFIX SYMM PDS PLUS VIL CT-1 60CM: Type: IMPLANTABLE DEVICE | Site: KNEE | Status: FUNCTIONAL

## 2021-06-07 DEVICE — SIGMA TIBIAL INSERT FIXED BEARING CURVED PLUS 3 10MM XLK
Type: IMPLANTABLE DEVICE | Site: KNEE | Status: FUNCTIONAL
Brand: SIGMA

## 2021-06-07 DEVICE — KNOTLESS TISSUE CONTROL DEVICE, UNDYED UNIDIRECTIONAL (ANTIBACTERIAL) SYNTHETIC ABSORBABLE DEVICE
Type: IMPLANTABLE DEVICE | Site: KNEE | Status: FUNCTIONAL
Brand: STRATAFIX

## 2021-06-07 DEVICE — P.F.C. SIGMA OVAL DOME PATELLA 3-PEG 35MM CEMENTED
Type: IMPLANTABLE DEVICE | Site: KNEE | Status: FUNCTIONAL
Brand: P.F.C. SIGMA

## 2021-06-07 DEVICE — SIGMA FEMORAL CRUCIATE RETAINING CEMENTED 3 LEFT
Type: IMPLANTABLE DEVICE | Site: KNEE | Status: FUNCTIONAL
Brand: SIGMA

## 2021-06-07 RX ORDER — HYDROMORPHONE HYDROCHLORIDE 1 MG/ML
0.25 INJECTION, SOLUTION INTRAMUSCULAR; INTRAVENOUS; SUBCUTANEOUS
Status: DISCONTINUED | OUTPATIENT
Start: 2021-06-07 | End: 2021-06-07 | Stop reason: HOSPADM

## 2021-06-07 RX ORDER — NALOXONE HCL 0.4 MG/ML
0.2 VIAL (ML) INJECTION AS NEEDED
Status: DISCONTINUED | OUTPATIENT
Start: 2021-06-07 | End: 2021-06-07 | Stop reason: HOSPADM

## 2021-06-07 RX ORDER — NEOSTIGMINE METHYLSULFATE 0.5 MG/ML
INJECTION, SOLUTION INTRAVENOUS AS NEEDED
Status: DISCONTINUED | OUTPATIENT
Start: 2021-06-07 | End: 2021-06-07 | Stop reason: SURG

## 2021-06-07 RX ORDER — FAMOTIDINE 20 MG/1
40 TABLET, FILM COATED ORAL DAILY
Status: DISCONTINUED | OUTPATIENT
Start: 2021-06-07 | End: 2021-06-08 | Stop reason: HOSPADM

## 2021-06-07 RX ORDER — PREGABALIN 75 MG/1
150 CAPSULE ORAL ONCE
Status: COMPLETED | OUTPATIENT
Start: 2021-06-07 | End: 2021-06-07

## 2021-06-07 RX ORDER — HYDROCODONE BITARTRATE AND ACETAMINOPHEN 7.5; 325 MG/1; MG/1
2 TABLET ORAL EVERY 4 HOURS PRN
Status: DISCONTINUED | OUTPATIENT
Start: 2021-06-07 | End: 2021-06-08 | Stop reason: HOSPADM

## 2021-06-07 RX ORDER — ACETAMINOPHEN 500 MG
1000 TABLET ORAL ONCE
Status: COMPLETED | OUTPATIENT
Start: 2021-06-07 | End: 2021-06-07

## 2021-06-07 RX ORDER — LIOTHYRONINE SODIUM 5 UG/1
7.5 TABLET ORAL NIGHTLY
Status: DISCONTINUED | OUTPATIENT
Start: 2021-06-07 | End: 2021-06-08 | Stop reason: HOSPADM

## 2021-06-07 RX ORDER — SODIUM CHLORIDE, SODIUM LACTATE, POTASSIUM CHLORIDE, CALCIUM CHLORIDE 600; 310; 30; 20 MG/100ML; MG/100ML; MG/100ML; MG/100ML
9 INJECTION, SOLUTION INTRAVENOUS CONTINUOUS
Status: DISCONTINUED | OUTPATIENT
Start: 2021-06-07 | End: 2021-06-07

## 2021-06-07 RX ORDER — MELOXICAM 15 MG/1
15 TABLET ORAL ONCE
Status: COMPLETED | OUTPATIENT
Start: 2021-06-07 | End: 2021-06-07

## 2021-06-07 RX ORDER — LIDOCAINE HYDROCHLORIDE 10 MG/ML
0.5 INJECTION, SOLUTION EPIDURAL; INFILTRATION; INTRACAUDAL; PERINEURAL ONCE AS NEEDED
Status: DISCONTINUED | OUTPATIENT
Start: 2021-06-07 | End: 2021-06-07 | Stop reason: HOSPADM

## 2021-06-07 RX ORDER — DOCUSATE SODIUM 100 MG/1
100 CAPSULE, LIQUID FILLED ORAL 2 TIMES DAILY
Status: DISCONTINUED | OUTPATIENT
Start: 2021-06-07 | End: 2021-06-08 | Stop reason: HOSPADM

## 2021-06-07 RX ORDER — ROCURONIUM BROMIDE 10 MG/ML
INJECTION, SOLUTION INTRAVENOUS AS NEEDED
Status: DISCONTINUED | OUTPATIENT
Start: 2021-06-07 | End: 2021-06-07 | Stop reason: SURG

## 2021-06-07 RX ORDER — SUCCINYLCHOLINE CHLORIDE 20 MG/ML
INJECTION INTRAMUSCULAR; INTRAVENOUS AS NEEDED
Status: DISCONTINUED | OUTPATIENT
Start: 2021-06-07 | End: 2021-06-07 | Stop reason: SURG

## 2021-06-07 RX ORDER — NALOXONE HCL 0.4 MG/ML
0.1 VIAL (ML) INJECTION
Status: DISCONTINUED | OUTPATIENT
Start: 2021-06-07 | End: 2021-06-08 | Stop reason: HOSPADM

## 2021-06-07 RX ORDER — PROMETHAZINE HYDROCHLORIDE 25 MG/1
25 SUPPOSITORY RECTAL ONCE AS NEEDED
Status: DISCONTINUED | OUTPATIENT
Start: 2021-06-07 | End: 2021-06-07 | Stop reason: HOSPADM

## 2021-06-07 RX ORDER — LEVOTHYROXINE SODIUM 0.03 MG/1
25 TABLET ORAL 2 TIMES DAILY
Status: DISCONTINUED | OUTPATIENT
Start: 2021-06-07 | End: 2021-06-08 | Stop reason: HOSPADM

## 2021-06-07 RX ORDER — ALBUTEROL SULFATE 2.5 MG/3ML
2.5 SOLUTION RESPIRATORY (INHALATION) ONCE AS NEEDED
Status: DISCONTINUED | OUTPATIENT
Start: 2021-06-07 | End: 2021-06-07 | Stop reason: HOSPADM

## 2021-06-07 RX ORDER — MIDAZOLAM HYDROCHLORIDE 1 MG/ML
0.5 INJECTION INTRAMUSCULAR; INTRAVENOUS
Status: DISCONTINUED | OUTPATIENT
Start: 2021-06-07 | End: 2021-06-07 | Stop reason: HOSPADM

## 2021-06-07 RX ORDER — SODIUM CHLORIDE, SODIUM LACTATE, POTASSIUM CHLORIDE, CALCIUM CHLORIDE 600; 310; 30; 20 MG/100ML; MG/100ML; MG/100ML; MG/100ML
100 INJECTION, SOLUTION INTRAVENOUS CONTINUOUS
Status: DISCONTINUED | OUTPATIENT
Start: 2021-06-07 | End: 2021-06-08 | Stop reason: HOSPADM

## 2021-06-07 RX ORDER — LABETALOL HYDROCHLORIDE 5 MG/ML
5 INJECTION, SOLUTION INTRAVENOUS
Status: DISCONTINUED | OUTPATIENT
Start: 2021-06-07 | End: 2021-06-07 | Stop reason: HOSPADM

## 2021-06-07 RX ORDER — FAMOTIDINE 10 MG/ML
20 INJECTION, SOLUTION INTRAVENOUS ONCE
Status: COMPLETED | OUTPATIENT
Start: 2021-06-07 | End: 2021-06-07

## 2021-06-07 RX ORDER — PROPOFOL 10 MG/ML
VIAL (ML) INTRAVENOUS AS NEEDED
Status: DISCONTINUED | OUTPATIENT
Start: 2021-06-07 | End: 2021-06-07 | Stop reason: SURG

## 2021-06-07 RX ORDER — TRANEXAMIC ACID 100 MG/ML
INJECTION, SOLUTION INTRAVENOUS AS NEEDED
Status: DISCONTINUED | OUTPATIENT
Start: 2021-06-07 | End: 2021-06-07 | Stop reason: SURG

## 2021-06-07 RX ORDER — HYDRALAZINE HYDROCHLORIDE 20 MG/ML
5 INJECTION INTRAMUSCULAR; INTRAVENOUS
Status: DISCONTINUED | OUTPATIENT
Start: 2021-06-07 | End: 2021-06-07 | Stop reason: HOSPADM

## 2021-06-07 RX ORDER — OXYCODONE AND ACETAMINOPHEN 10; 325 MG/1; MG/1
1 TABLET ORAL EVERY 4 HOURS PRN
Status: DISCONTINUED | OUTPATIENT
Start: 2021-06-07 | End: 2021-06-07 | Stop reason: HOSPADM

## 2021-06-07 RX ORDER — ASPIRIN 81 MG/1
81 TABLET ORAL EVERY 12 HOURS SCHEDULED
Status: DISCONTINUED | OUTPATIENT
Start: 2021-06-08 | End: 2021-06-08 | Stop reason: HOSPADM

## 2021-06-07 RX ORDER — DIPHENHYDRAMINE HCL 25 MG
25 CAPSULE ORAL
Status: DISCONTINUED | OUTPATIENT
Start: 2021-06-07 | End: 2021-06-07 | Stop reason: HOSPADM

## 2021-06-07 RX ORDER — SODIUM CHLORIDE 0.9 % (FLUSH) 0.9 %
3-10 SYRINGE (ML) INJECTION AS NEEDED
Status: DISCONTINUED | OUTPATIENT
Start: 2021-06-07 | End: 2021-06-07 | Stop reason: HOSPADM

## 2021-06-07 RX ORDER — FENTANYL CITRATE 50 UG/ML
50 INJECTION, SOLUTION INTRAMUSCULAR; INTRAVENOUS
Status: DISCONTINUED | OUTPATIENT
Start: 2021-06-07 | End: 2021-06-07 | Stop reason: HOSPADM

## 2021-06-07 RX ORDER — POLYETHYLENE GLYCOL 3350 17 G/17G
17 POWDER, FOR SOLUTION ORAL DAILY
Status: DISCONTINUED | OUTPATIENT
Start: 2021-06-07 | End: 2021-06-08 | Stop reason: HOSPADM

## 2021-06-07 RX ORDER — MELOXICAM 15 MG/1
15 TABLET ORAL DAILY
Status: DISCONTINUED | OUTPATIENT
Start: 2021-06-07 | End: 2021-06-08 | Stop reason: HOSPADM

## 2021-06-07 RX ORDER — BISACODYL 10 MG
10 SUPPOSITORY, RECTAL RECTAL DAILY PRN
Status: DISCONTINUED | OUTPATIENT
Start: 2021-06-07 | End: 2021-06-08 | Stop reason: HOSPADM

## 2021-06-07 RX ORDER — EPHEDRINE SULFATE 50 MG/ML
5 INJECTION, SOLUTION INTRAVENOUS ONCE AS NEEDED
Status: DISCONTINUED | OUTPATIENT
Start: 2021-06-07 | End: 2021-06-07 | Stop reason: HOSPADM

## 2021-06-07 RX ORDER — SODIUM CHLORIDE 0.9 % (FLUSH) 0.9 %
3 SYRINGE (ML) INJECTION EVERY 12 HOURS SCHEDULED
Status: DISCONTINUED | OUTPATIENT
Start: 2021-06-07 | End: 2021-06-07 | Stop reason: HOSPADM

## 2021-06-07 RX ORDER — PROMETHAZINE HYDROCHLORIDE 25 MG/1
25 TABLET ORAL ONCE AS NEEDED
Status: DISCONTINUED | OUTPATIENT
Start: 2021-06-07 | End: 2021-06-07 | Stop reason: HOSPADM

## 2021-06-07 RX ORDER — FLUMAZENIL 0.1 MG/ML
0.2 INJECTION INTRAVENOUS AS NEEDED
Status: DISCONTINUED | OUTPATIENT
Start: 2021-06-07 | End: 2021-06-07 | Stop reason: HOSPADM

## 2021-06-07 RX ORDER — CEFAZOLIN SODIUM 2 G/100ML
2 INJECTION, SOLUTION INTRAVENOUS EVERY 8 HOURS
Status: COMPLETED | OUTPATIENT
Start: 2021-06-07 | End: 2021-06-08

## 2021-06-07 RX ORDER — LIDOCAINE HYDROCHLORIDE 20 MG/ML
INJECTION, SOLUTION INFILTRATION; PERINEURAL AS NEEDED
Status: DISCONTINUED | OUTPATIENT
Start: 2021-06-07 | End: 2021-06-07 | Stop reason: SURG

## 2021-06-07 RX ORDER — FENTANYL CITRATE 50 UG/ML
INJECTION, SOLUTION INTRAMUSCULAR; INTRAVENOUS AS NEEDED
Status: DISCONTINUED | OUTPATIENT
Start: 2021-06-07 | End: 2021-06-07 | Stop reason: SURG

## 2021-06-07 RX ORDER — GLYCOPYRROLATE 0.2 MG/ML
INJECTION INTRAMUSCULAR; INTRAVENOUS AS NEEDED
Status: DISCONTINUED | OUTPATIENT
Start: 2021-06-07 | End: 2021-06-07 | Stop reason: SURG

## 2021-06-07 RX ORDER — HYDROCODONE BITARTRATE AND ACETAMINOPHEN 7.5; 325 MG/1; MG/1
1 TABLET ORAL EVERY 4 HOURS PRN
Status: DISCONTINUED | OUTPATIENT
Start: 2021-06-07 | End: 2021-06-08 | Stop reason: HOSPADM

## 2021-06-07 RX ORDER — UREA 10 %
3 LOTION (ML) TOPICAL NIGHTLY PRN
Status: DISCONTINUED | OUTPATIENT
Start: 2021-06-07 | End: 2021-06-08 | Stop reason: HOSPADM

## 2021-06-07 RX ORDER — FENTANYL CITRATE 50 UG/ML
25 INJECTION, SOLUTION INTRAMUSCULAR; INTRAVENOUS
Status: DISCONTINUED | OUTPATIENT
Start: 2021-06-07 | End: 2021-06-07 | Stop reason: HOSPADM

## 2021-06-07 RX ORDER — HYDROCODONE BITARTRATE AND ACETAMINOPHEN 7.5; 325 MG/1; MG/1
1 TABLET ORAL ONCE AS NEEDED
Status: DISCONTINUED | OUTPATIENT
Start: 2021-06-07 | End: 2021-06-07 | Stop reason: HOSPADM

## 2021-06-07 RX ORDER — IBUPROFEN 600 MG/1
600 TABLET ORAL ONCE AS NEEDED
Status: DISCONTINUED | OUTPATIENT
Start: 2021-06-07 | End: 2021-06-07 | Stop reason: HOSPADM

## 2021-06-07 RX ORDER — CEFAZOLIN SODIUM 2 G/100ML
2 INJECTION, SOLUTION INTRAVENOUS ONCE
Status: COMPLETED | OUTPATIENT
Start: 2021-06-07 | End: 2021-06-08

## 2021-06-07 RX ORDER — EPHEDRINE SULFATE 50 MG/ML
INJECTION, SOLUTION INTRAVENOUS AS NEEDED
Status: DISCONTINUED | OUTPATIENT
Start: 2021-06-07 | End: 2021-06-07 | Stop reason: SURG

## 2021-06-07 RX ORDER — BISACODYL 5 MG/1
10 TABLET, DELAYED RELEASE ORAL DAILY PRN
Status: DISCONTINUED | OUTPATIENT
Start: 2021-06-07 | End: 2021-06-08 | Stop reason: HOSPADM

## 2021-06-07 RX ORDER — DIPHENHYDRAMINE HYDROCHLORIDE 50 MG/ML
12.5 INJECTION INTRAMUSCULAR; INTRAVENOUS
Status: DISCONTINUED | OUTPATIENT
Start: 2021-06-07 | End: 2021-06-07 | Stop reason: HOSPADM

## 2021-06-07 RX ORDER — ONDANSETRON 2 MG/ML
INJECTION INTRAMUSCULAR; INTRAVENOUS AS NEEDED
Status: DISCONTINUED | OUTPATIENT
Start: 2021-06-07 | End: 2021-06-07 | Stop reason: SURG

## 2021-06-07 RX ORDER — DEXAMETHASONE SODIUM PHOSPHATE 10 MG/ML
INJECTION INTRAMUSCULAR; INTRAVENOUS AS NEEDED
Status: DISCONTINUED | OUTPATIENT
Start: 2021-06-07 | End: 2021-06-07 | Stop reason: SURG

## 2021-06-07 RX ORDER — ONDANSETRON 4 MG/1
4 TABLET, FILM COATED ORAL EVERY 6 HOURS PRN
Status: DISCONTINUED | OUTPATIENT
Start: 2021-06-07 | End: 2021-06-08 | Stop reason: HOSPADM

## 2021-06-07 RX ORDER — ACETAMINOPHEN 325 MG/1
325 TABLET ORAL EVERY 4 HOURS PRN
Status: DISCONTINUED | OUTPATIENT
Start: 2021-06-07 | End: 2021-06-08 | Stop reason: HOSPADM

## 2021-06-07 RX ORDER — HYDROMORPHONE HYDROCHLORIDE 1 MG/ML
0.5 INJECTION, SOLUTION INTRAMUSCULAR; INTRAVENOUS; SUBCUTANEOUS
Status: DISCONTINUED | OUTPATIENT
Start: 2021-06-07 | End: 2021-06-08 | Stop reason: HOSPADM

## 2021-06-07 RX ORDER — MAGNESIUM HYDROXIDE 1200 MG/15ML
LIQUID ORAL AS NEEDED
Status: DISCONTINUED | OUTPATIENT
Start: 2021-06-07 | End: 2021-06-07 | Stop reason: HOSPADM

## 2021-06-07 RX ORDER — ONDANSETRON 2 MG/ML
4 INJECTION INTRAMUSCULAR; INTRAVENOUS ONCE AS NEEDED
Status: DISCONTINUED | OUTPATIENT
Start: 2021-06-07 | End: 2021-06-07 | Stop reason: HOSPADM

## 2021-06-07 RX ORDER — LIOTHYRONINE SODIUM 5 UG/1
10 TABLET ORAL EVERY MORNING
Status: DISCONTINUED | OUTPATIENT
Start: 2021-06-08 | End: 2021-06-08 | Stop reason: HOSPADM

## 2021-06-07 RX ORDER — METOPROLOL SUCCINATE 50 MG/1
50 TABLET, EXTENDED RELEASE ORAL NIGHTLY
Status: DISCONTINUED | OUTPATIENT
Start: 2021-06-07 | End: 2021-06-08 | Stop reason: HOSPADM

## 2021-06-07 RX ORDER — ONDANSETRON 2 MG/ML
4 INJECTION INTRAMUSCULAR; INTRAVENOUS EVERY 6 HOURS PRN
Status: DISCONTINUED | OUTPATIENT
Start: 2021-06-07 | End: 2021-06-08 | Stop reason: HOSPADM

## 2021-06-07 RX ADMIN — GLYCOPYRROLATE 0.2 MG: 0.2 INJECTION INTRAMUSCULAR; INTRAVENOUS at 12:43

## 2021-06-07 RX ADMIN — MELOXICAM 15 MG: 15 TABLET ORAL at 10:47

## 2021-06-07 RX ADMIN — DEXAMETHASONE SODIUM PHOSPHATE 8 MG: 10 INJECTION INTRAMUSCULAR; INTRAVENOUS at 13:34

## 2021-06-07 RX ADMIN — HYDROMORPHONE HYDROCHLORIDE 0.25 MG: 1 INJECTION, SOLUTION INTRAMUSCULAR; INTRAVENOUS; SUBCUTANEOUS at 15:35

## 2021-06-07 RX ADMIN — FENTANYL CITRATE 25 MCG: 50 INJECTION, SOLUTION INTRAMUSCULAR; INTRAVENOUS at 15:20

## 2021-06-07 RX ADMIN — HYDROMORPHONE HYDROCHLORIDE 0.25 MG: 1 INJECTION, SOLUTION INTRAMUSCULAR; INTRAVENOUS; SUBCUTANEOUS at 15:30

## 2021-06-07 RX ADMIN — NEOSTIGMINE METHYLSULFATE 2 MG: 0.5 INJECTION INTRAVENOUS at 14:15

## 2021-06-07 RX ADMIN — GLYCOPYRROLATE 0.4 MG: 0.2 INJECTION INTRAMUSCULAR; INTRAVENOUS at 14:14

## 2021-06-07 RX ADMIN — CEFAZOLIN SODIUM 2 G: 2 INJECTION, SOLUTION INTRAVENOUS at 23:47

## 2021-06-07 RX ADMIN — HYDROCODONE BITARTRATE AND ACETAMINOPHEN 1 TABLET: 7.5; 325 TABLET ORAL at 23:48

## 2021-06-07 RX ADMIN — METOPROLOL SUCCINATE 50 MG: 50 TABLET, EXTENDED RELEASE ORAL at 23:47

## 2021-06-07 RX ADMIN — HYDROCODONE BITARTRATE AND ACETAMINOPHEN 1 TABLET: 7.5; 325 TABLET ORAL at 18:44

## 2021-06-07 RX ADMIN — CEFAZOLIN SODIUM 2 G: 2 INJECTION, SOLUTION INTRAVENOUS at 12:27

## 2021-06-07 RX ADMIN — ROCURONIUM BROMIDE 5 MG: 50 INJECTION INTRAVENOUS at 12:45

## 2021-06-07 RX ADMIN — ROCURONIUM BROMIDE 30 MG: 50 INJECTION INTRAVENOUS at 12:52

## 2021-06-07 RX ADMIN — POLYETHYLENE GLYCOL 3350 17 G: 17 POWDER, FOR SOLUTION ORAL at 18:44

## 2021-06-07 RX ADMIN — FENTANYL CITRATE 50 MCG: 50 INJECTION INTRAMUSCULAR; INTRAVENOUS at 12:43

## 2021-06-07 RX ADMIN — TRANEXAMIC ACID 1000 MG: 1 INJECTION, SOLUTION INTRAVENOUS at 13:14

## 2021-06-07 RX ADMIN — SUCCINYLCHOLINE CHLORIDE 100 MG: 20 INJECTION, SOLUTION INTRAMUSCULAR; INTRAVENOUS; PARENTERAL at 12:45

## 2021-06-07 RX ADMIN — MUPIROCIN 1 APPLICATION: 20 OINTMENT TOPICAL at 23:48

## 2021-06-07 RX ADMIN — CEFAZOLIN SODIUM 2 G: 2 INJECTION, SOLUTION INTRAVENOUS at 15:14

## 2021-06-07 RX ADMIN — FENTANYL CITRATE 25 MCG: 50 INJECTION, SOLUTION INTRAMUSCULAR; INTRAVENOUS at 15:25

## 2021-06-07 RX ADMIN — EPHEDRINE SULFATE 10 MG: 50 INJECTION INTRAVENOUS at 14:13

## 2021-06-07 RX ADMIN — ACETAMINOPHEN 1000 MG: 500 TABLET, FILM COATED ORAL at 10:47

## 2021-06-07 RX ADMIN — SODIUM CHLORIDE, POTASSIUM CHLORIDE, SODIUM LACTATE AND CALCIUM CHLORIDE 500 ML: 600; 310; 30; 20 INJECTION, SOLUTION INTRAVENOUS at 10:46

## 2021-06-07 RX ADMIN — LIDOCAINE HYDROCHLORIDE 80 MG: 20 INJECTION, SOLUTION INFILTRATION; PERINEURAL at 12:45

## 2021-06-07 RX ADMIN — PROPOFOL 110 MG: 10 INJECTION, EMULSION INTRAVENOUS at 12:45

## 2021-06-07 RX ADMIN — SODIUM CHLORIDE, POTASSIUM CHLORIDE, SODIUM LACTATE AND CALCIUM CHLORIDE 9 ML/HR: 600; 310; 30; 20 INJECTION, SOLUTION INTRAVENOUS at 12:20

## 2021-06-07 RX ADMIN — PREGABALIN 150 MG: 75 CAPSULE ORAL at 10:47

## 2021-06-07 RX ADMIN — FAMOTIDINE 20 MG: 10 INJECTION INTRAVENOUS at 10:46

## 2021-06-07 RX ADMIN — EPHEDRINE SULFATE 10 MG: 50 INJECTION INTRAVENOUS at 13:04

## 2021-06-07 RX ADMIN — DOCUSATE SODIUM 100 MG: 100 CAPSULE ORAL at 23:48

## 2021-06-07 RX ADMIN — FENTANYL CITRATE 50 MCG: 50 INJECTION INTRAMUSCULAR; INTRAVENOUS at 13:19

## 2021-06-07 RX ADMIN — ONDANSETRON 4 MG: 2 INJECTION INTRAMUSCULAR; INTRAVENOUS at 14:11

## 2021-06-07 NOTE — ANESTHESIA PREPROCEDURE EVALUATION
Anesthesia Evaluation     Patient summary reviewed and Nursing notes reviewed                Airway   Mallampati: II  TM distance: >3 FB  Neck ROM: full  Dental      Pulmonary - negative pulmonary ROS   Cardiovascular     ECG reviewed  Rate: normal    (+) hypertension, dysrhythmias PAC,       Neuro/Psych- negative ROS  GI/Hepatic/Renal/Endo - negative ROS     Musculoskeletal     Abdominal    Substance History - negative use     OB/GYN negative ob/gyn ROS         Other   arthritis,                      Anesthesia Plan    ASA 2     general   (I have reviewed the patient's history with the patient and the chart, including all pertinent laboratory results and imaging. I have explained the risks of anesthesia including but not limited to dental damage, corneal abrasion, nerve injury, MI, stroke, and death. Questions asked and answered. Anesthetic plan discussed with patient and team as indicated. Patient expressed understanding of the above.     PAC's )  intravenous induction     Anesthetic plan, all risks, benefits, and alternatives have been provided, discussed and informed consent has been obtained with: patient.

## 2021-06-07 NOTE — ANESTHESIA POSTPROCEDURE EVALUATION
"Patient: Madina Coughlin    Procedure Summary     Date: 06/07/21 Room / Location: [facility] OR [address] [place] [address] MAIN OR    Anesthesia Start: 1239 Anesthesia Stop: 1450    Procedure: LEFT TOTAL KNEE ARTHROPLASTY WITH CORINNE NAVIGATION (Left Knee) Diagnosis:       Arthritis of left knee      (Arthritis of left knee [M17.12])    Surgeons: Dinesh Rahman MD Provider: Daquan Jhaveri MD    Anesthesia Type: general ASA Status: 2          Anesthesia Type: general    Vitals  Vitals Value Taken Time   /79 06/07/21 1650   Temp 36.4 °C (97.5 °F) 06/07/21 1448   Pulse 54 06/07/21 1640   Resp 16 06/07/21 1635   SpO2 100 % 06/07/21 1653   Vitals shown include unvalidated device data.        Post Anesthesia Care and Evaluation    Patient location during evaluation: bedside  Patient participation: complete - patient participated  Level of consciousness: awake and alert  Pain management: adequate  Airway patency: patent  Anesthetic complications: No anesthetic complications    Cardiovascular status: acceptable  Respiratory status: acceptable  Hydration status: acceptable    Comments: /85   Pulse 63   Temp 36.4 °C (97.5 °F) (Oral)   Resp 16   Ht 172.7 cm (68\")   Wt 74.2 kg (163 lb 9.3 oz)   LMP  (LMP Unknown)   SpO2 99%   BMI 24.87 kg/m²     Patient is stable postoperatively and has adequately recovered from anesthesia as described above unless otherwise noted      "

## 2021-06-07 NOTE — OP NOTE
Operative Note    Name: Madina Coughlin  YOB: 1935  MRN: [id number]  BMI: Body mass index is 24.87 kg/m².    DATE OF SURGERY: 6/7/2021    PREOPERATIVE DIAGNOSIS: left knee end-stage osteoarthritis    POSTOPERATIVE DIAGNOSIS: left knee end-stage osteoarthritis    PROCEDURE PERFORMED: left total knee replacement with [place] navigation    SURGEON: Dr. Dinesh Rahman    ASSISTANT: KARLA Ernst    IMPLANTS: DepuyPFC    Estimated Blood Loss: 100cc  Specimens : none  Complications: none  22 Modifier:  none    DESCRIPTION OF PROCEDURE: The patient was taken to the operating room and placed in the supine position. Preoperative antibiotics were administered. Surgical time out was performed. After adequate induction of anesthesia, the leg was prepped and draped in the usual sterile fashion.  Surgical time out was performed. The leg was exsanguinated with an Esmarch bandage and the tourniquet inflated to 250 mmHg. A midline incision was performed followed by a medial parapatellar arthrotomy. The patella was subluxed laterally.  A portion of the fat pad, ACL, and anterior horns of the meniscus were excised.  The [employer] navigation device was affixed and navigated. The distal cut was made. The femur was then sized with a sizing guide. The femoral cutting block was placed and all femoral cuts were performed. The proximal tibia was exposed. [place] navigation protocol was accomplished.  9 millimeters were removed.  We used the extramedullary tibial cutting guide set for removal of 3mm of bone off the low side. The tibial cut was performed. The posterior horns of the menisci were excised. The posterior osteophytes were removed. Flexion extension blocks were then used to balance the knee. The tibial cut surface was then sized with the sizing templates and the tibial and femoral trial were then placed. The tray was aligned with the middle third of the tubercle.    Attention was then placed to the patella. The  patella was balanced to track centrally through range of motion.  It measured 23 and patella resurfacing was performed.   At this point all trial components were removed, the knee was copiously irrigated with pulsed lavage, and the knee was injected with anesthetic cocktail solution. The cut surfaces were then dried with clean lap sponges, and the components were cemented, first the tibia, followed by femur. The knee was held in full extension and all excess cement was removed. The knee was held still until the cement had completely hardened. We then placed the trial polyethylene spacer which resulted in full extension and excellent flexion-extension balance. We placed the final polyethylene spacer.   The knee was then copiously irrigated with the full 3 liters. The tourniquet was then released. There was excellent hemostasis. We closed the knee in multiple layers in standard fashion.  It may be noted that she had very thin dermis and additional care and time were taken to get the most optimal closure possible with multiple layers.  Wound was stable at time of closure.  A sterile dressing was applied. At the end of the case, the sponge and needle counts were reported as being correct. There were no known complications. The patient was then transported to the recovery room.    The surgical assistant performed retraction, suction, hemostasis, and specific limb positioning and manipulation required for accuracy of joint placement, and assistance in wound closure and dressing application.       Dinesh Rahman M.D.

## 2021-06-08 VITALS
WEIGHT: 163.58 LBS | OXYGEN SATURATION: 96 % | SYSTOLIC BLOOD PRESSURE: 115 MMHG | DIASTOLIC BLOOD PRESSURE: 67 MMHG | HEIGHT: 68 IN | HEART RATE: 52 BPM | RESPIRATION RATE: 16 BRPM | TEMPERATURE: 97.6 F | BODY MASS INDEX: 24.79 KG/M2

## 2021-06-08 LAB
HCT VFR BLD AUTO: 36.6 % (ref 34–46.6)
HGB BLD-MCNC: 12.5 G/DL (ref 12–15.9)

## 2021-06-08 PROCEDURE — 63710000001 HYDROCODONE-ACETAMINOPHEN 7.5-325 MG TABLET: Performed by: NURSE PRACTITIONER

## 2021-06-08 PROCEDURE — 85018 HEMOGLOBIN: CPT | Performed by: NURSE PRACTITIONER

## 2021-06-08 PROCEDURE — 63710000001 MELOXICAM 15 MG TABLET: Performed by: NURSE PRACTITIONER

## 2021-06-08 PROCEDURE — 97530 THERAPEUTIC ACTIVITIES: CPT

## 2021-06-08 PROCEDURE — 99024 POSTOP FOLLOW-UP VISIT: CPT | Performed by: NURSE PRACTITIONER

## 2021-06-08 PROCEDURE — A9270 NON-COVERED ITEM OR SERVICE: HCPCS | Performed by: NURSE PRACTITIONER

## 2021-06-08 PROCEDURE — 63710000001 ASPIRIN 81 MG TABLET DELAYED-RELEASE: Performed by: NURSE PRACTITIONER

## 2021-06-08 PROCEDURE — 63710000001 FAMOTIDINE 20 MG TABLET: Performed by: NURSE PRACTITIONER

## 2021-06-08 PROCEDURE — 63710000001 LEVOTHYROXINE 25 MCG TABLET: Performed by: NURSE PRACTITIONER

## 2021-06-08 PROCEDURE — 63710000001 LIOTHYRONINE 5 MCG TABLET: Performed by: NURSE PRACTITIONER

## 2021-06-08 PROCEDURE — 85014 HEMATOCRIT: CPT | Performed by: NURSE PRACTITIONER

## 2021-06-08 PROCEDURE — 63710000001 MUPIROCIN 2 % OINTMENT: Performed by: NURSE PRACTITIONER

## 2021-06-08 PROCEDURE — G0378 HOSPITAL OBSERVATION PER HR: HCPCS

## 2021-06-08 PROCEDURE — 63710000001 DOCUSATE SODIUM 100 MG CAPSULE: Performed by: NURSE PRACTITIONER

## 2021-06-08 PROCEDURE — 97161 PT EVAL LOW COMPLEX 20 MIN: CPT

## 2021-06-08 PROCEDURE — 63710000001 POLYETHYLENE GLYCOL 17 G PACK: Performed by: NURSE PRACTITIONER

## 2021-06-08 RX ORDER — ASPIRIN 81 MG/1
81 TABLET ORAL EVERY 12 HOURS SCHEDULED
Qty: 60 TABLET | Refills: 0
Start: 2021-06-08 | End: 2021-07-08

## 2021-06-08 RX ORDER — PSEUDOEPHEDRINE HCL 30 MG
100 TABLET ORAL 2 TIMES DAILY
Qty: 60 CAPSULE | Refills: 0
Start: 2021-06-08 | End: 2021-07-08

## 2021-06-08 RX ORDER — HYDROCODONE BITARTRATE AND ACETAMINOPHEN 7.5; 325 MG/1; MG/1
TABLET ORAL
Qty: 60 TABLET | Refills: 0 | Status: SHIPPED | OUTPATIENT
Start: 2021-06-08 | End: 2022-01-26

## 2021-06-08 RX ORDER — FAMOTIDINE 40 MG/1
40 TABLET, FILM COATED ORAL DAILY
Qty: 30 TABLET | Refills: 0
Start: 2021-06-09 | End: 2021-07-09

## 2021-06-08 RX ADMIN — LEVOTHYROXINE SODIUM 25 MCG: 0.03 TABLET ORAL at 06:26

## 2021-06-08 RX ADMIN — HYDROCODONE BITARTRATE AND ACETAMINOPHEN 1 TABLET: 7.5; 325 TABLET ORAL at 03:56

## 2021-06-08 RX ADMIN — MUPIROCIN 1 APPLICATION: 20 OINTMENT TOPICAL at 08:54

## 2021-06-08 RX ADMIN — ASPIRIN 81 MG: 81 TABLET, COATED ORAL at 08:54

## 2021-06-08 RX ADMIN — MELOXICAM 15 MG: 15 TABLET ORAL at 08:54

## 2021-06-08 RX ADMIN — DOCUSATE SODIUM 100 MG: 100 CAPSULE ORAL at 08:54

## 2021-06-08 RX ADMIN — FAMOTIDINE 40 MG: 20 TABLET, FILM COATED ORAL at 08:54

## 2021-06-08 RX ADMIN — POLYETHYLENE GLYCOL 3350 17 G: 17 POWDER, FOR SOLUTION ORAL at 08:54

## 2021-06-08 RX ADMIN — HYDROCODONE BITARTRATE AND ACETAMINOPHEN 1 TABLET: 7.5; 325 TABLET ORAL at 08:54

## 2021-06-08 RX ADMIN — HYDROCODONE BITARTRATE AND ACETAMINOPHEN 1 TABLET: 7.5; 325 TABLET ORAL at 13:01

## 2021-06-08 RX ADMIN — LIOTHYRONINE SODIUM 10 MCG: 5 TABLET ORAL at 06:28

## 2021-06-08 NOTE — CASE MANAGEMENT/SOCIAL WORK
Discharge Planning Assessment  [facility]     Patient Name: Madina Coughlin  MRN: [id number]  Today's Date: 6/8/2021    Admit Date: 6/7/2021    Discharge Needs Assessment     Row Name 06/08/21 1156       Living Environment    Lives With  alone    Current Living Arrangements  independent/assisted living facility    Primary Care Provided by  self    Provides Primary Care For  no one    Family Caregiver if Needed  child(rachel), adult    Quality of Family Relationships  helpful;involved;supportive       Resource/Environmental Concerns    Transportation Concerns  car, none       Transition Planning    Patient/Family Anticipates Transition to  inpatient rehabilitation facility    Patient/Family Anticipated Services at Transition      Transportation Anticipated  family or friend will provide       Discharge Needs Assessment    Readmission Within the Last 30 Days  no previous admission in last 30 days    Equipment Currently Used at Home  rollator    Discharge Facility/Level of Care Needs  nursing facility, skilled    Provided Post Acute Provider List?  N/A    N/A Provider List Comment  Requests [facility] SNF.        Discharge Plan     Row Name 06/08/21 1157       Plan    Plan  [facility] SNF -- Referral Pending.    Patient/Family in Agreement with Plan  yes    Plan Comments  Spoke with the patient, verified current information and explained the role of the CCP. Patient said she lives in independent living at [facility] and has very good family support. She's IADL and has a rollator. She has no history with SNF/HH. She plans to d/c to [facility]. Referral sent in Epic; called and left a message for Joshua to discuss. Patient said she plans for her friend/Celia to transport her by car at d/c. No other needs identified. Await SNFs determination.        Continued Care and Services - Admitted Since 6/7/2021     Destination     Service Provider Request Status  Selected Services Address Phone Fax Patient Preferred    [facility] [place]  Pending - [facility] Sent N/A [address] [facility] [address] [phone] --              Expected Discharge Date and Time     Expected Discharge Date Expected Discharge Time    Jun 8, 2021         Demographic Summary     Row Name 06/08/21 1156       General Information    Admission Type  observation    Reason for Consult  discharge planning    Preferred Language  English     Used During This Interaction  no       Contact Information    Permission Granted to Share Info With  ;family/designee        Functional Status     Row Name 06/08/21 1156       Functional Status    Usual Activity Tolerance  good    Current Activity Tolerance  good       Functional Status, IADL    Medications  independent    Meal Preparation  assistive equipment    Housekeeping  assistive equipment    Laundry  assistive equipment    Shopping  assistive equipment       Mental Status Summary    Recent Changes in Mental Status/Cognitive Functioning  no changes        Psychosocial     Row Name 06/08/21 1156       Intellectual Performance WDL    Level of Consciousness  Alert       Coping/Stress    Patient Personal Strengths  able to adapt    Sources of Support  adult child(rachel)    Reaction to Health Status  accepting    Understanding of Condition and Treatment  adequate understanding of medical condition       Developmental Stage (Eriksson's)    Developmental Stage  Stage 8 (65 years-death/Late Adulthood) Integrity vs. Despair        Abuse/Neglect    No documentation.       Legal    No documentation.       Substance Abuse    No documentation.       Patient Forms    No documentation.           Kristel Ferraro RN

## 2021-06-08 NOTE — CASE MANAGEMENT/SOCIAL WORK
Continued Stay Note  [facility]     Patient Name: Madina Coughlin  MRN: [id number]  Today's Date: 6/8/2021    Admit Date: 6/7/2021    Discharge Plan     Row Name 06/08/21 1239       Plan    Plan  [facility] SNF -- Accepted    Patient/Family in Agreement with Plan  yes    Plan Comments  Spoke with Joshua who has accepted the patient and has a SNF bed for her today. Updated the patient and nurse/MARYAN Painter who are both agreeable. Patient said she still plans for her friend/Celia to transport her by car. No other needs identified. Packet is on the chart.    Final Discharge Disposition Code  03 - skilled nursing facility (SNF)    Final Note  Patient's being discharged to [facility]. Patient discharging to SNF under Medicare for Covid-19 Preparedness.    Row Name 06/08/21 3407       Plan    Plan  [facility] SNF -- Referral Pending.    Patient/Family in Agreement with Plan  yes    Plan Comments  Spoke with the patient, verified current information and explained the role of the CCP. Patient said she lives in independent living at [facility] and has very good family support. She's IADL and has a rollator. She has no history with SNF/HH. She plans to d/c to [facility]. Referral sent in Epic; called and left a message for Johsua to discuss. Patient said she plans for her friend/Celia to transport her by car at d/c. No other needs identified. Await SNFs determination.        Discharge Codes    No documentation.       Expected Discharge Date and Time     Expected Discharge Date Expected Discharge Time    Jun 8, 2021             Kristel Ferraro RN

## 2021-06-08 NOTE — THERAPY EVALUATION
Patient Name: Madina Coughlin  : 1935    MRN: [id number]                              Today's Date: 2021       Admit Date: 2021    Visit Dx:     ICD-10-CM ICD-9-CM   1. Arthritis of left knee  M17.12 716.96     Patient Active Problem List   Diagnosis   • HTN (hypertension)   • Hypothyroid   • IBS (irritable bowel syndrome)   • SVT (supraventricular tachycardia) (CMS/HCC)   • Arthritis of left knee     Past Medical History:   Diagnosis Date   • Abnormal heart rate     SVT   • Arthritis    • Disease of thyroid gland    • History of jock itch     uses clotrimazole cream   • Hypertension    • Knee pain    • Mild dietary indigestion     TAKES DIGESTIVE ENZYMES AND IF SHE DOESN'T HAS A LOT OF GAS AND BLOATING      Past Surgical History:   Procedure Laterality Date   • CATARACT EXTRACTION EXTRACAPSULAR W/ INTRAOCULAR LENS IMPLANTATION Bilateral    • COLONOSCOPY     • MASTOID SURGERY      as a child   • TONSILLECTOMY     • TOTAL KNEE ARTHROPLASTY Left 2021    Procedure: LEFT TOTAL KNEE ARTHROPLASTY WITH CORINNE NAVIGATION;  Surgeon: Dinesh Rahman MD;  Location: [facility];  Service: Orthopedics;  Laterality: Left;     General Information     Row Name 21          Physical Therapy Time and Intention    Document Type  evaluation  -CF     Mode of Treatment  individual therapy;physical therapy  -     Row Name 21          General Information    Patient Profile Reviewed  yes  -CF     Prior Level of Function  independent: used cane prior  -CF     Existing Precautions/Restrictions  fall  -CF     Barriers to Rehab  none identified  -CF     Row Name 21          Living Environment    Lives With  alone  -CF     Row Name 21          Home Main Entrance    Number of Stairs, Main Entrance  none;other (see comments) elevator to second floor  -CF     Row Name 21          Stairs Within Home, Primary    Number of Stairs, Within Home, Primary  none  -CF     Row  Name 06/08/21 0826          Cognition    Orientation Status (Cognition)  oriented x 4  -CF     Row Name 06/08/21 0826          Safety Issues, Functional Mobility    Impairments Affecting Function (Mobility)  balance;endurance/activity tolerance;strength  -CF       User Key  (r) = Recorded By, (t) = Taken By, (c) = Cosigned By    Initials Name Provider Type     Elba Winchester, TAURUS Physical Therapist        Mobility     Row Name 06/08/21 0828          Bed Mobility    Bed Mobility  supine-sit  -CF     Supine-Sit Carefree (Bed Mobility)  supervision  -     Assistive Device (Bed Mobility)  head of bed elevated;bed rails  -     Row Name 06/08/21 0828          Transfers    Comment (Transfers)  Cues for hand placement with sit<>stand  -     Row Name 06/08/21 0828          Sit-Stand Transfer    Sit-Stand Carefree (Transfers)  contact guard;verbal cues  -     Row Name 06/08/21 0828          Gait/Stairs (Locomotion)    Carefree Level (Gait)  standby assist;contact guard;verbal cues  -     Assistive Device (Gait)  walker, front-wheeled  -     Distance in Feet (Gait)  100'  -CF     Deviations/Abnormal Patterns (Gait)  tawanda decreased;gait speed decreased;antalgic  -CF     Bilateral Gait Deviations  forward flexed posture  -CF     Left Sided Gait Deviations  weight shift ability decreased;heel strike decreased  -CF     Comment (Gait/Stairs)  Cues to increase L heel strike and reciprocal gait pattern, no LOB noted  -     Row Name 06/08/21 0828          Mobility    Extremity Weight-bearing Status  left lower extremity  -CF     Left Lower Extremity (Weight-bearing Status)  weight-bearing as tolerated (WBAT)  -CF       User Key  (r) = Recorded By, (t) = Taken By, (c) = Cosigned By    Initials Name Provider Type     Elba Winchester PT Physical Therapist        Obj/Interventions     Row Name 06/08/21 0838          Range of Motion Comprehensive    Comment, General Range of Motion  L approx 0-90, pain  limiting  -CF     Row Name 06/08/21 0838          Strength Comprehensive (MMT)    Comment, General Manual Muscle Testing (MMT) Assessment  BLE WFL, generalized weakness noted  -     Row Name 06/08/21 0838          Motor Skills    Therapeutic Exercise  other (see comments) tka exercises x10 reps  -     Row Name 06/08/21 0838          Balance    Balance Assessment  standing dynamic balance;standing static balance  -CF     Static Standing Balance  WFL;supported  -CF     Dynamic Standing Balance  mild impairment;supported  -CF     [facility] Name 06/08/21 0838          Sensory Assessment (Somatosensory)    Sensory Assessment (Somatosensory)  LE sensation intact  -CF       User Key  (r) = Recorded By, (t) = Taken By, (c) = Cosigned By    Initials Name Provider Type    CF Elba Winchester, PT Physical Therapist        Goals/Plan     [facility] Name 06/08/21 0839          Bed Mobility Goal 1 (PT)    Activity/Assistive Device (Bed Mobility Goal 1, PT)  bed mobility activities, all  -CF     [place] Level/Cues Needed (Bed Mobility Goal 1, PT)  modified independence  -CF     Time Frame (Bed Mobility Goal 1, PT)  1 week  -[facility] Name 06/08/21 0839          Transfer Goal 1 (PT)    Activity/Assistive Device (Transfer Goal 1, PT)  sit-to-stand/stand-to-sit;bed-to-chair/chair-to-bed;walker, rolling  -CF     [place] Level/Cues Needed (Transfer Goal 1, PT)  contact guard assist  -CF     Time Frame (Transfer Goal 1, PT)  1 week  -[facility] Name 06/08/21 0839          Gait Training Goal 1 (PT)    Activity/Assistive Device (Gait Training Goal 1, PT)  gait (walking locomotion);walker, rolling  -CF     [place] Level (Gait Training Goal 1, PT)  standby assist  -CF     Distance (Gait Training Goal 1, PT)  200'  -CF     Time Frame (Gait Training Goal 1, PT)  1 week  -[facility] Name 06/08/21 0839          ROM Goal 1 (PT)    ROM Goal 1 (PT)  L knee 0-90  -CF     Time Frame (ROM Goal 1, PT)  1 week  -CF       User Key  (r) =  Recorded By, (t) = Taken By, (c) = Cosigned By    Initials Name Provider Type    CF Elba Winchester, PT Physical Therapist        Clinical Impression     Row Name 06/08/21 0840          Pain    Additional Documentation  Pain Scale: FACES Pre/Post-Treatment (Group)  -     Row Name 06/08/21 0840          Pain Scale: Numbers Pre/Post-Treatment    Pain Location  knee  -CF     Pain Intervention(s)  Medication (See MAR);Repositioned;Ambulation/increased activity  -     Row Name 06/08/21 0840          Pain Scale: FACES Pre/Post-Treatment    Pain: FACES Scale, Pretreatment  2-->hurts little bit  -CF     Posttreatment Pain Rating  4-->hurts little more  -CF     Pain Location - Side  Left  -CF     Pain Location - Orientation  incisional  -CF     Pain Location  knee  -CF     Row Name 06/08/21 0840          Plan of Care Review    Plan of Care Reviewed With  patient  -CF     Outcome Summary  Pt is s/p L TKA. Pt reports living alone with elevator to access 2nd floor apartment, and owns RW. Pt with expected post op pain, generalized weakness, decreased ROM and decreased activity tolerance. Pt required supervision for bed mobility, cga for transfers and cga to ambulate with RW. Pt lives alone and may benefit from short rehab stay if increased assist is not available at home.  -     Row Name 06/08/21 0840          Therapy Assessment/Plan (PT)    Rehab Potential (PT)  good, to achieve stated therapy goals  -     Criteria for Skilled Interventions Met (PT)  yes  -CF     Predicted Duration of Therapy Intervention (PT)  1 week  -     Row Name 06/08/21 0840          Vital Signs    O2 Delivery Pre Treatment  room air  -CF     O2 Delivery Intra Treatment  room air  -CF     O2 Delivery Post Treatment  room air  -CF     Row Name 06/08/21 0840          Positioning and Restraints    Pre-Treatment Position  in bed  -CF     Post Treatment Position  chair  -CF     In Chair  reclined;call light within reach;encouraged to call for  assist;exit alarm on;notified nsg;with family/caregiver;LLE elevated  -CF       User Key  (r) = Recorded By, (t) = Taken By, (c) = Cosigned By    Initials Name Provider Type     Elba Winchester PT Physical Therapist        Outcome Measures     Row Name 06/08/21 0949          How much help from another person do you currently need...    Turning from your back to your side while in flat bed without using bedrails?  3  -CF     Moving from lying on back to sitting on the side of a flat bed without bedrails?  3  -CF     Moving to and from a bed to a chair (including a wheelchair)?  3  -CF     Standing up from a chair using your arms (e.g., wheelchair, bedside chair)?  3  -CF     Climbing 3-5 steps with a railing?  3  -CF     To walk in hospital room?  3  -CF     AM-PAC 6 Clicks Score (PT)  18  -CF     Row Name 06/08/21 0949          Functional Assessment    Outcome Measure Options  AM-PAC 6 Clicks Basic Mobility (PT)  -CF       User Key  (r) = Recorded By, (t) = Taken By, (c) = Cosigned By    Initials Name Provider Type    CF Elba Winchester, TAURUS Physical Therapist        Physical Therapy Education                 Title: PT OT SLP Therapies (Done)     Topic: Physical Therapy (Done)     Point: Mobility training (Done)     Learning Progress Summary           Patient Acceptance, E, VU,NR by CF at 6/8/2021 0950                   Point: Home exercise program (Done)     Learning Progress Summary           Patient Acceptance, E, VU,NR by CF at 6/8/2021 0950                   Point: Body mechanics (Done)     Learning Progress Summary           Patient Acceptance, E, VU,NR by CF at 6/8/2021 0950                   Point: Precautions (Done)     Learning Progress Summary           Patient Acceptance, E, VU,NR by CF at 6/8/2021 0950                               User Key     Initials Effective Dates Name Provider Type Discipline    CF 09/02/20 -  Elba Winchester PT Physical Therapist PT              PT Recommendation and  Plan  Planned Therapy Interventions (PT): balance training, bed mobility training, home exercise program, gait training, patient/family education, strengthening, transfer training, ROM (range of motion)  Plan of Care Reviewed With: patient  Outcome Summary: Pt is s/p L TKA. Pt reports living alone with elevator to access 2nd floor apartment, and owns RW. Pt with expected post op pain, generalized weakness, decreased ROM and decreased activity tolerance. Pt required supervision for bed mobility, cga for transfers and cga to ambulate with RW. Pt lives alone and may benefit from short rehab stay if increased assist is not available at home.     Time Calculation:   PT Charges     Row Name 06/08/21 0943             Time Calculation    Start Time  0824  -CF      Stop Time  0845  -CF      Time Calculation (min)  21 min  -CF      PT Received On  06/08/21  -CF      PT - Next Appointment  06/09/21  -      PT Goal Re-Cert Due Date  06/15/21  -CF         Time Calculation- PT    Total Timed Code Minutes- PT  17 minute(s)  -CF        User Key  (r) = Recorded By, (t) = Taken By, (c) = Cosigned By    Initials Name Provider Type    CF Elba Winchester, PT Physical Therapist        Therapy Charges for Today     Code Description Service Date Service Provider Modifiers Qty    44319743545 HC PT EVAL LOW COMPLEXITY 2 6/8/2021 Elba Winchester, PT GP 1    36056401845 HC PT THERAPEUTIC ACT EA 15 MIN 6/8/2021 Elba Winchester, PT GP 1    28466308345 HC PT THER SUPP EA 15 MIN 6/8/2021 Elba Winchester, PT GP 1          PT G-Codes  Outcome Measure Options: AM-PAC 6 Clicks Basic Mobility (PT)  AM-PAC 6 Clicks Score (PT): 18    Elba Winchester PT  6/8/2021

## 2021-06-08 NOTE — PROGRESS NOTES
"    Orthopedic Total Joint Progress Note        Patient: Madina Coughlin    Date of Admission: 6/7/2021 10:01 AM    YOB: 1935    Medical Record Number: [id number]    Attending Physician: Dinesh Rahman MD      POD # 1 Day Post-Op Procedure(s) (LRB):  LEFT TOTAL KNEE ARTHROPLASTY WITH CORINNE NAVIGATION (Left)       Systemic or Specific Complaints: Patient is doing well postoperatively and currently has no complaints.  Her pain is well controlled with oral pain medication.  She is urinating without difficulty.  She worked with physical therapy today and did well, however, they are recommending discharge to skilled nursing facility as she required supervision for bed mobility, transfers, ambulation, etc. and she lives at alone.  She plans to discharge to [place].  No issues with incision/wound.    Allergies: No Known Allergies    Medications:   Current Medications:  Scheduled Meds:aspirin, 81 mg, Oral, Q12H  docusate sodium, 100 mg, Oral, BID  famotidine, 40 mg, Oral, Daily  levothyroxine, 25 mcg, Oral, BID  liothyronine, 10 mcg, Oral, QAM  liothyronine, 7.5 mcg, Oral, Nightly  meloxicam, 15 mg, Oral, Daily  metoprolol succinate XL, 50 mg, Oral, Nightly  mupirocin, , Each Nare, BID  polyethylene glycol, 17 g, Oral, Daily      Continuous Infusions:lactated ringers, 100 mL/hr, Last Rate: Stopped (06/08/21 0606)      PRN Meds:.•  acetaminophen  •  bisacodyl  •  bisacodyl  •  HYDROcodone-acetaminophen  •  HYDROcodone-acetaminophen  •  HYDROmorphone **AND** naloxone  •  melatonin  •  ondansetron **OR** ondansetron      Physical Exam: 85 y.o. female   Wt Readings from Last 3 Encounters:   06/07/21 74.2 kg (163 lb 9.3 oz)   06/01/21 74.8 kg (165 lb)   05/27/21 74.8 kg (165 lb)     Ht Readings from Last 3 Encounters:   06/07/21 172.7 cm (68\")   06/01/21 172.7 cm (68\")   05/27/21 172.7 cm (68\")     Body mass index is 24.87 kg/m².    Vitals:    06/07/21 2345 06/08/21 0316 06/08/21 0634 06/08/21 1100   BP: " 149/71 124/69 103/60 115/67   BP Location: Left arm Left arm Left arm Right arm   Patient Position: Lying Lying Lying Lying   Pulse: 68 63 61 52   Resp: 16 16 16 16   Temp: 96.4 °F (35.8 °C) 96.8 °F (36 °C) 96.6 °F (35.9 °C) 97.6 °F (36.4 °C)   TempSrc: Skin Skin Skin Skin   SpO2: 96% 94% 93% 96%   Weight:       Height:            General Appearance:    General: alert and oriented         Abdomen/:     soft non-tender, non-distended, voiding without difficulty       Extremities:   Operative extremity neurovascular status intact. ROM appropriate.  Incision intact w/out signs or symptoms of infection.  No cyanosis, calf is soft and nontender.     Activity: Mobilizing Per P.T.   Weight Bearing: As Tolerated    Diagnostic Tests:   Admission on 06/07/2021   Component Date Value Ref Range Status   • Hemoglobin 06/08/2021 12.5  12.0 - 15.9 g/dL Final   • Hematocrit 06/08/2021 36.6  34.0 - 46.6 % Final       Imaging Results (Last 72 Hours)     Procedure Component Value Units Date/Time    XR Knee 1 or 2 View Left [[id number]] Collected: 06/07/21 1535     Updated: 06/07/21 1539    Narrative:      LEFT KNEE X-RAYS     CLINICAL HISTORY: Postop arthroplasty     3 views of left knee demonstrate a total knee arthroplasty that appears  in satisfactory position.     This report was finalized on 6/7/2021 3:36 PM by Dr. Daquan Reyes M.D.             Personally viewed ortho images and report     Assessment:  Doing well 1 Day Post-Op following total joint replacement  Acute Blood Loss Anemia, stable  Post-operative Pain  Limited mobility, requires use of walker and assistance when OOB.    Patient Active Problem List   Diagnosis   • HTN (hypertension)   • Hypothyroid   • IBS (irritable bowel syndrome)   • SVT (supraventricular tachycardia) (CMS/HCC)   • Arthritis of left knee        Plan:    Consults: none  Continue to monitor labs and/or v/s, for tolerance to post op blood loss.  Continue efforts to increase mobilization.  Continue  Pain Control Measures.  Continue incisional Care.  DVT prophylaxis.  Follow up in office with Dinesh Rahman M.D. In 2 weeks.    Discharge Plan:today to [facility]    Date: 6/8/2021  MIA Santiago

## 2021-06-08 NOTE — DISCHARGE PLACEMENT REQUEST
"Madina Rollins (85 y.o. Female)     Date of Birth Social Security Number Address Home Phone MRN    1935  [address]  [facility] [address] [phone] [id number]    [facility] Mar[facility] Status          None        Admission Date Admission Type Admitting Provider Attending Provider Department, Room/Bed    6/7/21 Elective Dinesh Rahman MD Makk, Stephen P, MD [address], P877/1    Discharge Date Discharge Disposition Discharge Destination         Skilled Nursing Facility (DC - External)              Attending Provider: Dinesh Rahman MD    Allergies: No Known Allergies    Isolation: None   Infection: None   Code Status: CPR    Ht: 172.7 cm (68\")   Wt: 74.2 kg (163 lb 9.3 oz)    Admission Cmt: None   Principal Problem: Arthritis of left knee [M17.12]                 Active Insurance as of 6/7/2021     Primary Coverage     Payor Plan Insurance Group Employer/Plan Group    MEDICARE MEDICARE A & B      Payor Plan Address Payor Plan Phone Number Payor Plan Fax Number Effective Dates    [address] [phone]  7/1/2002 - None Entered    [facility] [id number]       Subscriber Name Subscriber Birth Date Member ID       MADINA ROLLINS 1935 [id number]           Secondary Coverage     Payor Plan Insurance Group Employer/Plan Group    AARP  SUP AARP HEALTH CARE OPTIONS      Payor Plan Address Payor Plan Phone Number Payor Plan Fax Number Effective Dates    [facility] [phone]  1/1/2017 - None Entered    [address]       [facility] [address]       Subscriber Name Subscriber Birth Date Member ID       MADINA ROLLINS 1935 [id number]                 Emergency Contacts      (Rel.) Home Phone Work Phone Mobile Phone    AIMEE ROLLINS (Son) [phone] -- [phone]              "

## 2021-06-08 NOTE — PLAN OF CARE
Goal Outcome Evaluation:  Plan of Care Reviewed With: patient           Outcome Summary: Pt is s/p L TKA. Pt reports living alone with elevator to access 2nd floor apartment, and owns RW. Pt with expected post op pain, generalized weakness, decreased ROM and decreased activity tolerance. Pt required supervision for bed mobility, cga for transfers and cga to ambulate with RW. Pt lives alone and may benefit from short rehab stay if increased assist is not available at home.    Patient was intermittently wearing a face mask during this therapy encounter. Therapist used appropriate personal protective equipment including eye protection, mask, and gloves.  Mask used was standard procedure mask. Appropriate PPE was worn during the entire therapy session. Hand hygiene was completed before and after therapy session. Patient is not in enhanced droplet precautions.

## 2021-06-08 NOTE — PLAN OF CARE
Goal Outcome Evaluation:           Progress: improving   Pt is a post op day 1 of a left total knee. Dressing is clean dry and intact. Pt continues with the ACE wrap. Pt has been up to the chair. Pt has ambulated to the bathroom with an assist x1. Voiding function intact. Pt has minimal complaints of pain. Pt educated on the importance of monitoring blood pressures related to comorbidity of HTn. Pt voiced understanding. Pt is resting at this time, will continue to monitor.

## 2021-06-08 NOTE — PLAN OF CARE
Goal Outcome Evaluation:               POD #1 LTKA, acewrap CDI, WBAT, worked with PT, voiding without difficulty, pt reports adequate pain control with norco 7.5, plan is for rehab at [place], educated on the importance of BP monitoring related to hx of HTN

## 2021-06-08 NOTE — DISCHARGE SUMMARY
Orthopedic Discharge Summary      Patient: Madina Coughlin  YOB: 1935  Medical Record Number: [id number]    Attending Physician: Dinesh Rahman MD  Consulting Physician(s):   Consults     No orders found for last 30 day(s).          Date of Admission: 6/7/2021 10:01 AM  Date of Discharge: 6/8/2021    Discharge Diagnosis: NC TOTAL KNEE ARTHROPLASTY [28552] (LEFT TOTAL KNEE ARTHROPLASTY WITH CORINNE NAVIGATION),   Acute Blood Loss Anemia, stable  Post-operative Pain  Limited mobility, requires use of walker and assistance when OOB.    Presenting Problem/History of Present Illness: Arthritis of left knee [M17.12]    Allergies: No Known Allergies    Discharge Medications       Discharge Medications      New Medications      Instructions Start Date   aspirin 81 MG EC tablet   81 mg, Oral, Every 12 Hours Scheduled      docusate sodium 100 MG capsule   100 mg, Oral, 2 Times Daily      famotidine 40 MG tablet  Commonly known as: PEPCID   40 mg, Oral, Daily   Start Date: June 9, 2021     HYDROcodone-acetaminophen 7.5-325 MG per tablet  Commonly known as: NORCO   Take 1-2 tablets po q4-6hr PRN severe pain         Continue These Medications      Instructions Start Date   azelastine 0.1 % nasal spray  Commonly known as: ASTELIN   USE 1 TO 2 PUFFS IN EACH NOSTRIL AT BEDTIME      clotrimazole-betamethasone 1-0.05 % cream  Commonly known as: LOTRISONE   1 application, Topical, As Needed      DIGESTIVE ENZYME PO   2 each, Oral, 2 Times Daily      levocetirizine 5 MG tablet  Commonly known as: XYZAL   5 mg, Oral, Every Evening      levothyroxine 25 MCG tablet  Commonly known as: SYNTHROID, LEVOTHROID   25 mcg, Oral, 2 Times Daily      liothyronine 5 MCG tablet  Commonly known as: CYTOMEL   7.5 mcg, Oral, Nightly      liothyronine 5 MCG tablet  Commonly known as: CYTOMEL   10 mcg, Oral, Every Morning      melatonin 3 MG tablet   3 mg, Oral, Nightly PRN      metoprolol succinate XL 50 MG 24 hr tablet  Commonly known  as: TOPROL-XL   50 mg, Oral, Nightly      multivitamin tablet tablet   1 tablet, Oral, Daily      Multivitamin Women 50+ tablet tablet   1 tablet, Oral, Daily      PREGNENOLONE PO   10 mg, Oral, Daily      PROBIOTIC PO   1 capsule, Oral, Daily               Past Medical History:   Diagnosis Date   • Abnormal heart rate     SVT   • Arthritis    • Disease of thyroid gland    • History of jock itch     uses clotrimazole cream   • Hypertension    • Knee pain    • Mild dietary indigestion     TAKES DIGESTIVE ENZYMES AND IF SHE DOESN'T HAS A LOT OF GAS AND BLOATING         Past Surgical History:   Procedure Laterality Date   • CATARACT EXTRACTION EXTRACAPSULAR W/ INTRAOCULAR LENS IMPLANTATION Bilateral    • COLONOSCOPY     • MASTOID SURGERY      as a child   • TONSILLECTOMY     • TOTAL KNEE ARTHROPLASTY Left 6/7/2021    Procedure: LEFT TOTAL KNEE ARTHROPLASTY WITH CORINNE NAVIGATION;  Surgeon: Dinesh Rahman MD;  Location: [facility];  Service: Orthopedics;  Laterality: Left;        Social History     Occupational History   • Not on file   Tobacco Use   • Smoking status: Never Smoker   • Smokeless tobacco: Never Used   Vaping Use   • Vaping Use: Never used   Substance and Sexual Activity   • Alcohol use: Yes     Comment: couple yearly   • Drug use: Never   • Sexual activity: Not on file      Social History     Social History Narrative   • Not on file        Family History   Problem Relation Age of Onset   • Coronary artery disease Paternal Grandfather    • Coronary artery disease Maternal Grandfather    • Breast cancer Maternal Aunt    • Lymphoma Brother    • Ovarian cancer Neg Hx    • Uterine cancer Neg Hx    • Colon cancer Neg Hx    • Malig Hyperthermia Neg Hx          Physical Exam: 85 y.o. female   Body mass index is 24.87 kg/m².  Facility age limit for growth percentiles is 20 years.  Vitals:    06/08/21 1100   BP: 115/67   Pulse: 52   Resp: 16   Temp: 97.6 °F (36.4 °C)   SpO2: 96%         General Appearance:     Alert, cooperative, in no acute distress                      Vitals:    06/07/21 2345 06/08/21 0316 06/08/21 0634 06/08/21 1100   BP: 149/71 124/69 103/60 115/67   BP Location: Left arm Left arm Left arm Right arm   Patient Position: Lying Lying Lying Lying   Pulse: 68 63 61 52   Resp: 16 16 16 16   Temp: 96.4 °F (35.8 °C) 96.8 °F (36 °C) 96.6 °F (35.9 °C) 97.6 °F (36.4 °C)   TempSrc: Skin Skin Skin Skin   SpO2: 96% 94% 93% 96%   Weight:       Height:            DIAGNOSTIC TESTS:   Admission on 06/07/2021   Component Date Value Ref Range Status   • Hemoglobin 06/08/2021 12.5  12.0 - 15.9 g/dL Final   • Hematocrit 06/08/2021 36.6  34.0 - 46.6 % Final       Imaging Results (Last 72 Hours)     Procedure Component Value Units Date/Time    XR Knee 1 or 2 View Left [[id number]] Collected: 06/07/21 1535     Updated: 06/07/21 1539    Narrative:      LEFT KNEE X-RAYS     CLINICAL HISTORY: Postop arthroplasty     3 views of left knee demonstrate a total knee arthroplasty that appears  in satisfactory position.     This report was finalized on 6/7/2021 3:36 PM by Dr. Daquan Reyes M.D.             Hospital Course:  85 y.o. female admitted to [facility] to services of Dinesh Rahman MD with Arthritis of left knee [M17.12] on 6/7/2021 and underwent NC TOTAL KNEE ARTHROPLASTY [73423] (LEFT TOTAL KNEE ARTHROPLASTY WITH CORINNE NAVIGATION)  Per Dinesh Rahman MD. Antibiotic and VTE prophylaxis were per SCIP protocols. Post-operatively the patient transferred to the post-operative floor where the patient underwent mobilization therapy that included active as well as passive ROM exercises. Opioids were titrated to achieve appropriate pain management to allow for participation in mobilization exercises. Vital signs are now stable. On the day of discharge the wound was clean, dry and intact and calf was soft and non tender and Homans sign was negative. Operative extremity neurovascular status remains intact.   Appropriate  education re: incision care, activity levels, medications, and follow up visits was completed and all questions were answered. The patient is now deemed stable for discharge.    Condition on Discharge:  Stable    Total Joint Replacement Discharge Instructions: Patient is to continue with physical therapy exercises twice daily and continue working with the physical therapist as ordered  and should be up walking every 2 hours during the day in addition to the physical therapy exercises. Patient may weight bear as tolerated unless otherwise specified. Continue to ice regularly. Do frequent ankle pumping exercises while you are sitting with legs elevated.  Patient also instructed on deep breathing, coughing, and using  incentive spirometer during hospitalization and encouraged to continue to use at home regularly.        VI. FOLLOW-UP VISITS:  ? Follow up in the office with Dr Dinesh Lin in 2 weeks - If already scheduled (see Future Appointments) for date and time, if not yet scheduled, patient to call the office at [phone] to schedule. Prescriptions were given for pain medication, nausea, constipation, and blood thinner therapy.    ? If you have any concerns or suspected complications prior to your follow up visit, please call your surgeon's office. Do not wait until your appointment time if you suspect complications. These will need to be addressed in the office promptly.      Future Appointments   Date Time Provider Department Center   6/23/2021  3:30 PM Shena Phoenix APRN [facility] [address] [facility]     Additional Instructions for the Follow-ups that You Need to Schedule     Ambulatory Referral to Home Health   As directed      Face to Face Visit Date: 6/8/2021    Follow-up provider for Plan of Care?: I will be treating the patient on an ongoing basis.  Please send me the Plan of Care for signature.    Follow-up provider: DINESH LIN [[id number]]    Reason/Clinical Findings: post surgical    Describe mobility limitations  that make leaving home difficult: Requires the assistance of another to leave home    Nursing/Therapeutic Services Requested: Physical Therapy    PT orders: Total joint pathway    Frequency: 1 Week 1         Discharge Follow-up with Specialty: Orthopedics; 2 Weeks   As directed      Specialty: Orthopedics    Follow Up: 2 Weeks    Follow Up Details: Return to the office to see Dr. Dinesh Rahman               Discharge Disposition Plan:today to home, home health and when cleared by physical therapy as safe for discharge    Date: 6/8/2021    MIA Santiago

## 2021-06-15 ENCOUNTER — TELEPHONE (OUTPATIENT)
Dept: ORTHOPEDIC SURGERY | Facility: HOSPITAL | Age: 86
End: 2021-06-15

## 2021-06-15 NOTE — TELEPHONE ENCOUNTER
Called and spoke with Ms. Coughlin as she is SP LTK. She says she is doing good. She is at [facility] and the PT says she is doing well enough that he thinks she may be able to go home tomorrow. Her pain is controlled. She says she is having some BP issues she is on metoprolol normally and she says she is not getting it consistently like she does at home. She is having no symptoms with it. She inquired about taking a shower and her dressing. Informed her that the dressing is waterproof and she can shower POD 3 and told her where to find that in the D/C paperwork. Also told her that the dressing can stay in place until she follows up with MD. She verbalized understanding. She has no other questions for me at this time. My contact information was given should she have any issues. She verbalized understanding.     Ms. Coughlin stated she did complete the online joint class.

## 2021-06-15 NOTE — THERAPY TREATMENT NOTE
Outpatient Physical Therapy Ortho Treatment Note  [facility]     Patient Name: Madina Coughlin  : 1935  MRN: [id number]  Today's Date: 2021      Visit Date: 2021    Visit Dx:    ICD-10-CM ICD-9-CM   1. Chronic pain of left knee  M25.562 719.46    G89.29 338.29   2. Weakness of left lower extremity  R29.898 729.89   3. Gait difficulty  R26.9 781.2   4. Range of motion deficit  M25.60 719.50       Patient Active Problem List   Diagnosis   • HTN (hypertension)   • Hypothyroid   • IBS (irritable bowel syndrome)   • SVT (supraventricular tachycardia) (CMS/HCC)        Past Medical History:   Diagnosis Date   • Abnormal heart rate    • Disease of thyroid gland         No past surgical history on file.                    PT Assessment/Plan     Row Name 21 1600          PT Assessment    Assessment Comments  Patient demonstrates mildly antalgic/compensated gait ambulating with SPC.  Continued with previous exercise with cuing provided for correct form/technique.  Added static double limb balance work on blue foam with CG/SBA today which was most challenging for her in semi tandem stance with L foot forward.    -RA        PT Plan    PT Plan Comments  Continue to work on core/LE strength/stabilization and functional mobility/gait  -RA       User Key  (r) = Recorded By, (t) = Taken By, (c) = Cosigned By    Initials Name Provider Type    Pao Chiang, PT Physical Therapist            OP Exercises     Row Name 21 1400             Subjective Comments    Subjective Comments  Knee's more sore/aching.  Seems like the shot's wearing off.    -RA         Subjective Pain    Able to rate subjective pain?  yes  -RA      Pre-Treatment Pain Level  5  -RA         Total Minutes    66157 - PT Therapeutic Exercise Minutes  35  -RA      17703 -  PT Neuromuscular Reeducation Minutes  3  -RA      21604 - PT Therapeutic Activity Minutes  8  -RA         Exercise 1    Exercise Name 1  haroldo NAYLOR  -KAREN       Cueing 1  Verbal  -RA      Time 1  5 min  -RA      Additional Comments  L5  -RA         Exercise 2    Exercise Name 2  Quad set w/towel roll  -RA      Cueing 2  Verbal;Tactile  -RA      Time 2  15  -RA         Exercise 3    Exercise Name 3  SAQ  -RA      Cueing 3  Verbal;Tactile  -RA      Sets 3  2  -RA      Reps 3  10  -RA      Time 3  3sec  -RA      Additional Comments  3# Humberto  -RA         Exercise 4    Exercise Name 4  Glute sets  -RA      Cueing 4  Verbal;Tactile  -RA      Reps 4  15  -RA      Time 4  3sec  -RA         Exercise 5    Exercise Name 5  LAQ  -RA      Cueing 5  Verbal;Tactile;Demo  -RA      Reps 5  15  -RA      Time 5  3sec  -RA      Additional Comments  3# Humberto  -RA         Exercise 6    Exercise Name 6  Sit to stand strategy, practice to reduce UE assist.  -RA      Cueing 6  Verbal;Demo  -RA      Time 6  3 x 5   -RA      Additional Comments  increased pain was causing her to shift weight more to R LE  -RA         Exercise 7    Exercise Name 7  HS curl  -RA      Cueing 7  Verbal;Demo  -RA      Reps 7  15  -RA      Additional Comments  3# Humberto   -RA         Exercise 8    Exercise Name 8  HS stretch seated  -RA      Cueing 8  Verbal;Demo  -RA      Reps 8  3  -RA      Time 8  20  -RA      Additional Comments  Humberto  -RA         Exercise 9    Exercise Name 9  HL hip abd w/ tband  -RA      Cueing 9  Verbal;Tactile  -RA      Sets 9  3 (1 set ea humberto, L, R)  -RA      Reps 9  10 ea  -RA      Time 9  2-3 sec  -RA      Additional Comments  yellow  -RA         Exercise 10    Exercise Name 10  HL add w/ball  -RA      Cueing 10  Verbal;Tactile  -RA      Reps 10  15  -RA      Time 10  3sec  -RA         Exercise 11    Exercise Name 11  gait work Heel strike to toe off, 1 UE support  -RA      Cueing 11  Verbal;Demo  -RA      Reps 11  3 laps  -RA         Exercise 12    Exercise Name 12  DLS  -RA      Cueing 12  Verbal;Demo  -RA      Reps 12  4 HT  -RA      Additional Comments  floor  -RA         Exercise 13    Exercise  Name 13  1/2 tandem  -RA      Cueing 13  Verbal;Demo  -RA      Reps 13  4 HT  -RA      Additional Comments  floor  -RA         Exercise 14    Exercise Name 14  Blue foam, static balance   -RA      Cueing 14  Verbal;Demo  -RA      Reps 14  3  -RA      Time 14  30 sec ea rhomberg (EO), semi tandem w/ R foot and w/ L foot forward  -RA      Additional Comments  CG/SBA  -RA        User Key  (r) = Recorded By, (t) = Taken By, (c) = Cosigned By    Initials Name Provider Type    RA Pao Summers, PT Physical Therapist                           Therapy Education  Given: Symptoms/condition management, Pain management, Posture/body mechanics, Mobility training  Program: Reinforced, Progressed  How Provided: Verbal, Demonstration  Provided to: Patient  Level of Understanding: Teach back education performed, Verbalized              Time Calculation:   Start Time: 1431  Stop Time: 1520  Time Calculation (min): 49 min  Therapy Charges for Today     Code Description Service Date Service Provider Modifiers Qty    [id number]  PT THER PROC EA 15 MIN 4/14/2021 Pao Summesr, PT GP 2    [id number]  PT THERAPEUTIC ACT EA 15 MIN 4/14/2021 Pao Summers, PT GP 1                    Pao Summers, PT  4/14/2021      no

## 2021-06-17 ENCOUNTER — TELEPHONE (OUTPATIENT)
Dept: ORTHOPEDIC SURGERY | Facility: CLINIC | Age: 86
End: 2021-06-17

## 2021-06-17 DIAGNOSIS — M17.12 ARTHRITIS OF LEFT KNEE: Primary | ICD-10-CM

## 2021-06-17 NOTE — TELEPHONE ENCOUNTER
Provider: DR.STEPHEN LIN/ROHAN BELLAMY  Caller: RAH CLINTON   Relationship to Patient: DAUGHTER (PATIENT ON SPEAKER PHONE WITH DAUGHTER)  [facility]: [facility]  [phone]  Phone Number: [phone]  Reason for Call:  DAUGHTER AND PATIENT HAVE POST OP QUESTIONS

## 2021-06-21 RX ORDER — TRAMADOL HYDROCHLORIDE 50 MG/1
50 TABLET ORAL EVERY 6 HOURS PRN
Qty: 42 TABLET | Refills: 0 | Status: SHIPPED | OUTPATIENT
Start: 2021-06-21 | End: 2022-01-26

## 2021-06-21 NOTE — TELEPHONE ENCOUNTER
Spoke with patient and her daughter, answered all questions. Rx for tramadol sent to her pharmacy as she is no longer taking norco due to side effects. Will fill out handicap placard form and email to her today.

## 2021-06-23 ENCOUNTER — OFFICE VISIT (OUTPATIENT)
Dept: ORTHOPEDIC SURGERY | Facility: CLINIC | Age: 86
End: 2021-06-23

## 2021-06-23 VITALS — HEIGHT: 68 IN | BODY MASS INDEX: 24.25 KG/M2 | TEMPERATURE: 98.4 F | WEIGHT: 160 LBS

## 2021-06-23 DIAGNOSIS — M25.562 LEFT KNEE PAIN, UNSPECIFIED CHRONICITY: Primary | ICD-10-CM

## 2021-06-23 PROCEDURE — 73560 X-RAY EXAM OF KNEE 1 OR 2: CPT | Performed by: NURSE PRACTITIONER

## 2021-06-23 PROCEDURE — 99024 POSTOP FOLLOW-UP VISIT: CPT | Performed by: NURSE PRACTITIONER

## 2021-06-23 NOTE — PROGRESS NOTES
Madina Coughlin : 1935 MRN: [id number] DATE: 2021  Body mass index is 24.33 kg/m².  Vitals:    21 1533   Temp: 98.4 °F (36.9 °C)       DIAGNOSIS: 2 week follow up left total knee arthroplasty    SUBJECTIVE:Patient returns today for 2 week follow up of left total knee replacement. She is doing well with no current complaints. She is taking tylenol for pain control- both norco and tramadol upset her stomach. She is ambulating using a walker. She plans to do outpatient PT at [facility] and has it scheduled already.    OBJECTIVE:   Exam:. The incision is healing appropriately. No sign of infection. Range of motion is progressing as expected. The calf is soft and nontender with a negative Homans sign.    DIAGNOSTIC STUDIES  2V AP&Lat of the left knee were done in the office today  Indication, findings and comparison: images were reviewed for evaluation of recent knee replacement. They demonstrate a well positioned, well aligned knee replacement without complicating factors noted. In comparison with previous films there has been interval implant placement.    ASSESSMENT: 2 week status post left knee replacement expected healing.    PLAN: 1) dressing removed, exofin fusion in place   2) Order given for PT and pain medicine (as needed)   3) Continue ice PRN   4) Continue EC Aspirin 81mg BID until 4 weeks out from surgery   5) Follow up in 4 weeks with repeat Xrays of left knee (2 views)   6) Wait for 3 months after surgery for routine teeth cleaning and continue with taking a dose of antibiotics before dental procedures for 2 yrs post total joint replacement.    Shena Phoenix, MIA  2021

## 2021-07-21 ENCOUNTER — OFFICE VISIT (OUTPATIENT)
Dept: ORTHOPEDIC SURGERY | Facility: CLINIC | Age: 86
End: 2021-07-21

## 2021-07-21 VITALS — TEMPERATURE: 97.5 F | BODY MASS INDEX: 23.95 KG/M2 | WEIGHT: 158 LBS | HEIGHT: 68 IN

## 2021-07-21 DIAGNOSIS — Z96.652 S/P TKR (TOTAL KNEE REPLACEMENT), LEFT: Primary | ICD-10-CM

## 2021-07-21 DIAGNOSIS — Z96.652 STATUS POST TOTAL LEFT KNEE REPLACEMENT: ICD-10-CM

## 2021-07-21 PROCEDURE — 99024 POSTOP FOLLOW-UP VISIT: CPT | Performed by: ORTHOPAEDIC SURGERY

## 2021-07-21 PROCEDURE — 73560 X-RAY EXAM OF KNEE 1 OR 2: CPT | Performed by: ORTHOPAEDIC SURGERY

## 2021-07-21 RX ORDER — ASPIRIN 81 MG/1
TABLET, CHEWABLE ORAL
COMMUNITY
Start: 2021-06-15 | End: 2022-10-25

## 2021-07-21 NOTE — PROGRESS NOTES
Patient Name: Madina Coughlin   YOB: 1935  Referring Primary Care Physician: Tara Russell MD  BMI: Body mass index is 24.02 kg/m².    Chief Complaint:    Chief Complaint   Patient presents with   • Left Knee - Follow-up        HPI:     Madina Coughlin is a 86 y.o. female who presents today for evaluation of   Chief Complaint   Patient presents with   • Left Knee - Follow-up   . She is about 6 weeks status post left total knee arthroplasty and she feels as though she is doing well. She still has a little bit of the dressing adherent. I told her she could put Vaseline or antibiotic ointment to help loosen that up. She still uses a cane for endurance.    Subjective   Medications:   Home Medications:  Current Outpatient Medications on File Prior to Visit   Medication Sig   • aspirin 81 MG chewable tablet TAKE 1 TABLET BY MOUTH TWICE A DAY. FOLLOW UP WITH ORTHOPEDIC SURGEON FOR FURTHER GUIDANCE.   • azelastine (ASTELIN) 0.1 % nasal spray USE 1 TO 2 PUFFS IN EACH NOSTRIL AT BEDTIME   • clotrimazole-betamethasone (LOTRISONE) 1-0.05 % cream Apply 1 application topically to the appropriate area as directed As Needed.   • Digestive Enzymes (DIGESTIVE ENZYME PO) Take 2 each by mouth 2 (Two) Times a Day.   • levocetirizine (XYZAL) 5 MG tablet Take 5 mg by mouth Every Evening.   • levothyroxine (SYNTHROID, LEVOTHROID) 25 MCG tablet Take 25 mcg by mouth 2 (Two) Times a Day.   • liothyronine (CYTOMEL) 5 MCG tablet Take 10 mcg by mouth Every Morning.   • liothyronine (CYTOMEL) 5 MCG tablet Take 7.5 mcg by mouth Every Night.   • melatonin 3 MG tablet Take 3 mg by mouth At Night As Needed for Sleep.   • metoprolol succinate XL (TOPROL-XL) 50 MG 24 hr tablet Take 50 mg by mouth Every Night.   • multivitamin (MULTI-VITAMIN PO) Take 1 tablet by mouth Daily.   • multivitamin with minerals (Multivitamin Women 50+) tablet tablet Take 1 tablet by mouth Daily.   • Pregnenolone Micronized (PREGNENOLONE PO) Take 10 mg by  mouth Daily.   • Probiotic Product (PROBIOTIC PO) Take 1 capsule by mouth Daily.   • HYDROcodone-acetaminophen (NORCO) 7.5-325 MG per tablet Take 1-2 tablets po q4-6hr PRN severe pain   • traMADol (ULTRAM) 50 MG tablet Take 1 tablet by mouth Every 6 (Six) Hours As Needed for Moderate Pain .     Current Facility-Administered Medications on File Prior to Visit   Medication   • Chlorhexidine Gluconate Cloth 2 % pads 1 each     Current Medications:  Scheduled Meds:  Continuous Infusions:No current facility-administered medications for this visit.    PRN Meds:.    I have reviewed the patient's medical history in detail and updated the computerized patient record.  Review and summarization of old records includes:    Past Medical History:   Diagnosis Date   • Abnormal heart rate     SVT   • Arthritis    • Disease of thyroid gland    • History of jock itch     uses clotrimazole cream   • Hypertension    • Knee pain    • Mild dietary indigestion     TAKES DIGESTIVE ENZYMES AND IF SHE DOESN'T HAS A LOT OF GAS AND BLOATING         Past Surgical History:   Procedure Laterality Date   • CATARACT EXTRACTION EXTRACAPSULAR W/ INTRAOCULAR LENS IMPLANTATION Bilateral    • COLONOSCOPY     • MASTOID SURGERY      as a child   • TONSILLECTOMY     • TOTAL KNEE ARTHROPLASTY Left 6/7/2021    Procedure: LEFT TOTAL KNEE ARTHROPLASTY WITH CORINNE NAVIGATION;  Surgeon: Dinesh Rahman MD;  Location: [facility];  Service: Orthopedics;  Laterality: Left;        Social History     Occupational History   • Not on file   Tobacco Use   • Smoking status: Never Smoker   • Smokeless tobacco: Never Used   Vaping Use   • Vaping Use: Never used   Substance and Sexual Activity   • Alcohol use: Yes     Comment: couple yearly   • Drug use: Never   • Sexual activity: Defer      Social History     Social History Narrative   • Not on file        Family History   Problem Relation Age of Onset   • Coronary artery disease Paternal Grandfather    • Coronary  "artery disease Maternal Grandfather    • Breast cancer Maternal Aunt    • Lymphoma Brother    • Ovarian cancer Neg Hx    • Uterine cancer Neg Hx    • Colon cancer Neg Hx    • Malig Hyperthermia Neg Hx        ROS: 14 point review of systems was performed and all other systems were reviewed and are negative except for documented findings in HPI and today's encounter.     Allergies: No Known Allergies  Constitutional:  Denies fever, shaking or chills   Eyes:  Denies change in visual acuity   HENT:  Denies nasal congestion or sore throat   Respiratory:  Denies cough or shortness of breath   Cardiovascular:  Denies chest pain or severe LE edema   GI:  Denies abdominal pain, nausea, vomiting, bloody stools or diarrhea   Musculoskeletal:  Numbness, tingling, pain, or loss of motor function only as noted above in history of present illness.  : Denies painful urination or hematuria  Integument:  Denies rash, lesion or ulceration   Neurologic:  Denies headache or focal weakness  Endocrine:  Denies lymphadenopathy  Psych:  Denies confusion or change in mental status   Hem:  Denies active bleeding    OBJECTIVE:  Physical Exam: 86 y.o. female  Wt Readings from Last 3 Encounters:   07/21/21 71.7 kg (158 lb)   06/23/21 72.6 kg (160 lb)   06/07/21 74.2 kg (163 lb 9.3 oz)     Ht Readings from Last 1 Encounters:   07/21/21 172.7 cm (68\")     Body mass index is 24.02 kg/m².  Vitals:    07/21/21 1338   Temp: 97.5 °F (36.4 °C)     Vital signs reviewed.     General Appearance:    Alert, cooperative, in no acute distress                  Eyes: conjunctiva clear  ENT: external ears and nose atraumatic  CV: no peripheral edema  Resp: normal respiratory effort  Skin: no rashes or wounds; normal turgor  Psych: mood and affect appropriate  Lymph: no nodes appreciated  Neuro: gross sensation intact  Vascular:  Palpable peripheral pulse in noted extremity  Musculoskeletal Extremities: Examination today shows that her skin has healed " beautifully. Her calf is soft. She is 0 degrees to 125 degrees. She still uses a cane for endurance.    Radiology:   Two views of the left knee, including AP and lateral views, were obtained and reviewed in the office today for postoperative follow-up. With comparison views, these demonstrated good position of her total knee implants.        Assessment:     ICD-10-CM ICD-9-CM   1. S/P TKR (total knee replacement), left  Z96.652 V43.65   2. Status post total left knee replacement  Z96.652 V43.65        MDM/Plan:   The diagnosis(es), natural history, pathophysiology and treatment for diagnosis(es) were discussed. Opportunity given and questions answered.  Biomechanics of pertinent body areas discussed.  When appropriate, the use of ambulatory aids discussed.    The patient was advised to apply Vaseline or antibiotic ointment to the dressing to help loosen that up. She was advised to continue using the cane for endurance. Recommendations given include: antibiotic prophylaxis, postoperative total knee precautions, warnings, and healing explained. The patient will follow up in 2 months with x-rays.    Inflammation/pain control; with cold, heat, elevation and/or liniments discussed as appropriate  HOME EXERCISE/PT program encouraged  TOTAL JOINT recommendations and precautions, including antibiotic prophylaxis discussed. Advice given and questions answered.       7/21/2021    Scribed for Dinesh Rahman MD by Maco Lezama.  07/21/21   16:42 EDT    I have personally performed the services described in this document as scribed by the above individual, and it is both accurate and complete.  Dinesh Rahman MD  7/22/2021  12:05 EDT

## 2021-09-15 ENCOUNTER — OFFICE VISIT (OUTPATIENT)
Dept: ORTHOPEDIC SURGERY | Facility: CLINIC | Age: 86
End: 2021-09-15

## 2021-09-15 VITALS — BODY MASS INDEX: 23.95 KG/M2 | TEMPERATURE: 97.8 F | WEIGHT: 158 LBS | HEIGHT: 68 IN

## 2021-09-15 DIAGNOSIS — Z96.652 S/P TKR (TOTAL KNEE REPLACEMENT), LEFT: Primary | ICD-10-CM

## 2021-09-15 PROCEDURE — 73562 X-RAY EXAM OF KNEE 3: CPT | Performed by: ORTHOPAEDIC SURGERY

## 2021-09-15 PROCEDURE — 99213 OFFICE O/P EST LOW 20 MIN: CPT | Performed by: ORTHOPAEDIC SURGERY

## 2021-09-16 NOTE — PROGRESS NOTES
"Patient Name: Madina Coughlin   YOB: 1935  Referring Primary Care Physician: Tara Russell MD  BMI: Body mass index is 24.02 kg/m².    Chief Complaint:    Chief Complaint   Patient presents with   • Left Knee - Post-op Knee        HPI:     Madina Coughlin is a 86 y.o. female who presents today for evaluation of   Chief Complaint   Patient presents with   • Left Knee - Post-op Knee   .  Patient is about 3-1/2-month status post left total knee arthroplasty.  She is doing very well with it.  She says she has \"no pain\".  She gets a little mild soreness after exercise she is very pleased      Subjective   Medications:   Home Medications:  Current Outpatient Medications on File Prior to Visit   Medication Sig   • aspirin 81 MG chewable tablet TAKE 1 TABLET BY MOUTH TWICE A DAY. FOLLOW UP WITH ORTHOPEDIC SURGEON FOR FURTHER GUIDANCE.   • azelastine (ASTELIN) 0.1 % nasal spray USE 1 TO 2 PUFFS IN EACH NOSTRIL AT BEDTIME   • clotrimazole-betamethasone (LOTRISONE) 1-0.05 % cream Apply 1 application topically to the appropriate area as directed As Needed.   • Digestive Enzymes (DIGESTIVE ENZYME PO) Take 2 each by mouth 2 (Two) Times a Day.   • HYDROcodone-acetaminophen (NORCO) 7.5-325 MG per tablet Take 1-2 tablets po q4-6hr PRN severe pain   • levocetirizine (XYZAL) 5 MG tablet Take 5 mg by mouth Every Evening.   • levothyroxine (SYNTHROID, LEVOTHROID) 25 MCG tablet Take 25 mcg by mouth 2 (Two) Times a Day.   • liothyronine (CYTOMEL) 5 MCG tablet Take 10 mcg by mouth Every Morning.   • liothyronine (CYTOMEL) 5 MCG tablet Take 7.5 mcg by mouth Every Night.   • melatonin 3 MG tablet Take 3 mg by mouth At Night As Needed for Sleep.   • metoprolol succinate XL (TOPROL-XL) 50 MG 24 hr tablet Take 50 mg by mouth Every Night.   • multivitamin (MULTI-VITAMIN PO) Take 1 tablet by mouth Daily.   • multivitamin with minerals (Multivitamin Women 50+) tablet tablet Take 1 tablet by mouth Daily.   • Pregnenolone Micronized " (PREGNENOLONE PO) Take 10 mg by mouth Daily.   • Probiotic Product (PROBIOTIC PO) Take 1 capsule by mouth Daily.   • traMADol (ULTRAM) 50 MG tablet Take 1 tablet by mouth Every 6 (Six) Hours As Needed for Moderate Pain .     Current Facility-Administered Medications on File Prior to Visit   Medication   • Chlorhexidine Gluconate Cloth 2 % pads 1 each     Current Medications:  Scheduled Meds:  Continuous Infusions:No current facility-administered medications for this visit.    PRN Meds:.    I have reviewed the patient's medical history in detail and updated the computerized patient record.  Review and summarization of old records includes:    Past Medical History:   Diagnosis Date   • Abnormal heart rate     SVT   • Arthritis    • Disease of thyroid gland    • History of jock itch     uses clotrimazole cream   • Hypertension    • Knee pain    • Mild dietary indigestion     TAKES DIGESTIVE ENZYMES AND IF SHE DOESN'T HAS A LOT OF GAS AND BLOATING         Past Surgical History:   Procedure Laterality Date   • CATARACT EXTRACTION EXTRACAPSULAR W/ INTRAOCULAR LENS IMPLANTATION Bilateral    • COLONOSCOPY     • MASTOID SURGERY      as a child   • TONSILLECTOMY     • TOTAL KNEE ARTHROPLASTY Left 6/7/2021    Procedure: LEFT TOTAL KNEE ARTHROPLASTY WITH CORINNE NAVIGATION;  Surgeon: Dinesh Rahman MD;  Location: [facility];  Service: Orthopedics;  Laterality: Left;        Social History     Occupational History   • Not on file   Tobacco Use   • Smoking status: Never Smoker   • Smokeless tobacco: Never Used   Vaping Use   • Vaping Use: Never used   Substance and Sexual Activity   • Alcohol use: Yes     Comment: couple yearly   • Drug use: Never   • Sexual activity: Defer      Social History     Social History Narrative   • Not on file        Family History   Problem Relation Age of Onset   • Coronary artery disease Paternal Grandfather    • Coronary artery disease Maternal Grandfather    • Breast cancer Maternal Aunt    •  "Lymphoma Brother    • Ovarian cancer Neg Hx    • Uterine cancer Neg Hx    • Colon cancer Neg Hx    • Malig Hyperthermia Neg Hx        ROS: 14 point review of systems was performed and all other systems were reviewed and are negative except for documented findings in HPI and today's encounter.     Allergies: No Known Allergies  Constitutional:  Denies fever, shaking or chills   Eyes:  Denies change in visual acuity   HENT:  Denies nasal congestion or sore throat   Respiratory:  Denies cough or shortness of breath   Cardiovascular:  Denies chest pain or severe LE edema   GI:  Denies abdominal pain, nausea, vomiting, bloody stools or diarrhea   Musculoskeletal:  Numbness, tingling, pain, or loss of motor function only as noted above in history of present illness.  : Denies painful urination or hematuria  Integument:  Denies rash, lesion or ulceration   Neurologic:  Denies headache or focal weakness  Endocrine:  Denies lymphadenopathy  Psych:  Denies confusion or change in mental status   Hem:  Denies active bleeding    OBJECTIVE:  Physical Exam: 86 y.o. female  Wt Readings from Last 3 Encounters:   09/15/21 71.7 kg (158 lb)   07/21/21 71.7 kg (158 lb)   06/23/21 72.6 kg (160 lb)     Ht Readings from Last 1 Encounters:   09/15/21 172.7 cm (68\")     Body mass index is 24.02 kg/m².  Vitals:    09/15/21 1554   Temp: 97.8 °F (36.6 °C)     Vital signs reviewed.     General Appearance:    Alert, cooperative, in no acute distress                  Eyes: conjunctiva clear  ENT: external ears and nose atraumatic  CV: no peripheral edema  Resp: normal respiratory effort  Skin: no rashes or wounds; normal turgor  Psych: mood and affect appropriate  Lymph: no nodes appreciated  Neuro: gross sensation intact  Vascular:  Palpable peripheral pulse in noted extremity  Musculoskeletal Extremities: Exam today shows good range of motion good stability incisions healing nicely and calf is soft    Radiology:   AP lateral left knee taken " the office today for postop follow-up with comparison views show excellent alignment fixation        Assessment:     ICD-10-CM ICD-9-CM   1. S/P TKR (total knee replacement), left  Z96.652 V43.65        MDM/Plan:   The diagnosis(es), natural history, pathophysiology and treatment for diagnosis(es) were discussed. Opportunity given and questions answered.  Biomechanics of pertinent body areas discussed.  When appropriate, the use of ambulatory aids discussed.    Inflammation/pain control; with cold, heat, elevation and/or liniments discussed as appropriate  HOME EXERCISE/PT program encouraged  TOTAL JOINT recommendations and precautions, including antibiotic prophylaxis discussed. Advice given and questions answered.       9/16/2021    Much of this encounter note is an electronic transcription/translation of spoken language to printed text. The electronic translation of spoken language may permit erroneous, or at times, nonsensical words or phrases to be inadvertently transcribed; Although I have reviewed the note for such errors, some may still exist

## 2021-10-08 ENCOUNTER — IMMUNIZATION (OUTPATIENT)
Dept: VACCINE CLINIC | Facility: HOSPITAL | Age: 86
End: 2021-10-08

## 2021-10-08 PROCEDURE — 91300 HC SARSCOV02 VAC 30MCG/0.3ML IM: CPT | Performed by: INTERNAL MEDICINE

## 2021-10-08 PROCEDURE — 0004A ADM SARSCOV2 30MCG/0.3ML BOOSTER: CPT | Performed by: INTERNAL MEDICINE

## 2021-10-08 PROCEDURE — 0001A: CPT | Performed by: INTERNAL MEDICINE

## 2021-10-19 ENCOUNTER — TELEPHONE (OUTPATIENT)
Dept: ORTHOPEDIC SURGERY | Facility: CLINIC | Age: 86
End: 2021-10-19

## 2021-10-19 DIAGNOSIS — M54.2 NECK PAIN: Primary | ICD-10-CM

## 2021-10-19 NOTE — TELEPHONE ENCOUNTER
Caller: Madina Coughlin    Relationship to patient: Self    Best call back number: [phone]    Patient is needing: PATIENT IS HAVING NECK PAIN AND WANTING TO BE REFERRED TO DR MANZO. SHE IS SEEING IF YOU WOULD REFER HER. PLEASE ADVISE.

## 2021-10-19 NOTE — TELEPHONE ENCOUNTER
CALLED PATIENT BACK AND SHE SAID SHE WOULD STILL LIKE THE REFERRAL AND THAT SHE WILL GET WITH HER PCP ABOUT GETTING THOSE IMAGES.

## 2021-12-02 ENCOUNTER — OFFICE VISIT (OUTPATIENT)
Dept: OBSTETRICS AND GYNECOLOGY | Age: 86
End: 2021-12-02

## 2021-12-02 VITALS
WEIGHT: 160 LBS | SYSTOLIC BLOOD PRESSURE: 126 MMHG | BODY MASS INDEX: 24.25 KG/M2 | DIASTOLIC BLOOD PRESSURE: 72 MMHG | HEIGHT: 68 IN

## 2021-12-02 DIAGNOSIS — N90.89 VULVAR IRRITATION: Primary | ICD-10-CM

## 2021-12-02 DIAGNOSIS — N90.89 VULVAR LESION: ICD-10-CM

## 2021-12-02 PROCEDURE — 99212 OFFICE O/P EST SF 10 MIN: CPT | Performed by: OBSTETRICS & GYNECOLOGY

## 2021-12-02 PROCEDURE — 56605 BIOPSY OF VULVA/PERINEUM: CPT | Performed by: OBSTETRICS & GYNECOLOGY

## 2021-12-02 RX ORDER — NYSTATIN 100000 U/G
OINTMENT TOPICAL SEE ADMIN INSTRUCTIONS
COMMUNITY
Start: 2021-10-11 | End: 2022-08-05

## 2021-12-02 NOTE — PROGRESS NOTES
"Chief complaint:vulvar itching    HPI  Madina Coughlin is a 86 y.o. female comes in for evaluation of vulvar itching. She was scheduled for an annual exam and declines this today. She notes the skin irritation in her groin resolved last year after using barrier cream. She notes she had knee surgery and had a UTI. She used the antibiotics and notes she started having more vulvar itching. She reports she has had itching in vulvar area on/off at times but it has been worse recently. She notes her primary MD gave her nystatin cream and this has helped but symptoms continue. She has reviewed her symptoms and feels she may have lichen sclerosus.        The following portions of the patient's history were reviewed and updated as appropriate: allergies, current medications, past family history, past medical history, past social history, past surgical history and problem list.    Review of Systems  Pertinent items are noted in HPI.    /72   Ht 172.7 cm (68\")   Wt 72.6 kg (160 lb)   LMP  (LMP Unknown)   Breastfeeding No   BMI 24.33 kg/m²         Physical Exam  Constitutional:       Appearance: Normal appearance.   Pulmonary:      Effort: Pulmonary effort is normal.   Genitourinary:         Comments: Thin tissue extending from labia minora to posterior perineum with labial agglutination noted consistent with lichen sclerosus. Focal area of erythema on left labia noted and biopsied. No other focal lesions noted.  Neurological:      General: No focal deficit present.      Mental Status: She is alert and oriented to person, place, and time.   Psychiatric:         Mood and Affect: Mood normal.         Behavior: Behavior normal.             Diagnoses and all orders for this visit:    1. Vulvar irritation (Primary)  -     Reference Histopathology    2. Vulvar lesion        vulvar biopsy  preop dx: vulvar lesion, vulvar irritation  Postop dx: same  Procedure: vulvar biopsy  MD: Nicole Wong MD  Procedure: after verbal " consent, the site was prepped with betadine X 3. The site was injected with 0.1 cc of 1 % lidocaine with epinephrine. A 5 mm punch biopsy was obtained and the skin defect was closed with 4-0 vicryl. The site was hemostatic with observation and application of silver nitrate to edge. Patient tolerated the procedure well. Sterile technique was used. Post-biopsy instructions were reviewed with the patient.     Recommend f/u visit 1 week. Discussed temovate. Pt desires to await path before starting rx.

## 2021-12-06 ENCOUNTER — TELEPHONE (OUTPATIENT)
Dept: OBSTETRICS AND GYNECOLOGY | Age: 86
End: 2021-12-06

## 2021-12-06 RX ORDER — CLOBETASOL PROPIONATE 0.5 MG/G
1 OINTMENT TOPICAL 2 TIMES DAILY
Qty: 15 G | Refills: 1 | Status: SHIPPED | OUTPATIENT
Start: 2021-12-06

## 2021-12-07 NOTE — TELEPHONE ENCOUNTER
Called pt and discussed her email. Sent rx for temovate and instructions reviewed with pt. She was counseled regarding strong potency of this steroid ointment.

## 2021-12-08 ENCOUNTER — TELEPHONE (OUTPATIENT)
Dept: OBSTETRICS AND GYNECOLOGY | Age: 86
End: 2021-12-08

## 2021-12-08 LAB
DX ICD CODE: NORMAL
DX ICD CODE: NORMAL
PATH REPORT.FINAL DX SPEC: NORMAL
PATH REPORT.GROSS SPEC: NORMAL
PATH REPORT.MICROSCOPIC SPEC OTHER STN: NORMAL
PATH REPORT.RELEVANT HX SPEC: NORMAL
PATH REPORT.SITE OF ORIGIN SPEC: NORMAL
PATHOLOGIST NAME: NORMAL
PAYMENT PROCEDURE: NORMAL

## 2021-12-08 NOTE — PROGRESS NOTES
"Chief complaint:vulvar irritation    HPI  Madina Coughlin is a 86 y.o. female presents for f/u visit. She notes some mild itching posteriorly on vulva but improved from last visit.          The following portions of the patient's history were reviewed and updated as appropriate: allergies, current medications, past family history, past medical history, past social history, past surgical history and problem list.    Review of Systems  Pertinent items are noted in HPI.    /78   Ht 172.7 cm (68\")   Wt 72.8 kg (160 lb 6.4 oz)   LMP  (LMP Unknown)   Breastfeeding No   BMI 24.39 kg/m²         Physical Exam  Constitutional:       Appearance: Normal appearance.   Genitourinary:     Comments: Biopsy site healing well. Stitch removed. Labial agglutination noted. No focal lesions noted.   Neurological:      Mental Status: She is alert.             Diagnoses and all orders for this visit:    1. Vulvar irritation (Primary)      Vulvar biopsy with vulvitis. Not diagnostic for lichen. Pt aware. She has had improvement with temovate. She will complete 2 week course and f/u with her dermatologist as topical irritant/contact reaction favored by pathologist.     Recommend mammogram and pt declines    Recommend she book annual, she will consider          "

## 2021-12-10 ENCOUNTER — OFFICE VISIT (OUTPATIENT)
Dept: OBSTETRICS AND GYNECOLOGY | Age: 86
End: 2021-12-10

## 2021-12-10 VITALS
DIASTOLIC BLOOD PRESSURE: 78 MMHG | SYSTOLIC BLOOD PRESSURE: 124 MMHG | HEIGHT: 68 IN | BODY MASS INDEX: 24.31 KG/M2 | WEIGHT: 160.4 LBS

## 2021-12-10 DIAGNOSIS — N90.89 VULVAR IRRITATION: Primary | ICD-10-CM

## 2021-12-10 PROCEDURE — 99212 OFFICE O/P EST SF 10 MIN: CPT | Performed by: OBSTETRICS & GYNECOLOGY

## 2021-12-10 RX ORDER — NYSTATIN 100000 [USP'U]/G
POWDER TOPICAL
COMMUNITY
Start: 2021-09-15 | End: 2022-08-05

## 2021-12-15 ENCOUNTER — OFFICE VISIT (OUTPATIENT)
Dept: ORTHOPEDIC SURGERY | Facility: CLINIC | Age: 86
End: 2021-12-15

## 2021-12-15 VITALS — BODY MASS INDEX: 23.4 KG/M2 | RESPIRATION RATE: 18 BRPM | TEMPERATURE: 97.1 F | WEIGHT: 158 LBS | HEIGHT: 69 IN

## 2021-12-15 DIAGNOSIS — Z96.652 S/P TKR (TOTAL KNEE REPLACEMENT), LEFT: Primary | ICD-10-CM

## 2021-12-15 PROCEDURE — 99213 OFFICE O/P EST LOW 20 MIN: CPT | Performed by: ORTHOPAEDIC SURGERY

## 2021-12-15 PROCEDURE — 73562 X-RAY EXAM OF KNEE 3: CPT | Performed by: ORTHOPAEDIC SURGERY

## 2021-12-15 NOTE — PROGRESS NOTES
Patient Name: Madina Coughlin   YOB: 1935  Referring Primary Care Physician: Tara Russell MD  BMI: Body mass index is 23.67 kg/m².    Chief Complaint:    Chief Complaint   Patient presents with   • Left Knee - Follow-up        HPI:     Madina Coughlin is a 86 y.o. female who presents today for evaluation of   Chief Complaint   Patient presents with   • Left Knee - Follow-up   .  Patient is seen today for follow-up on her left total knee replacement.  She is about 6 months out and says she is doing well.  Range of motion goes from 0 to about 125 degrees she has good stability and her incisions healing nicely.  She has some trouble with balance and arthritic changes in both feet and so she uses a Rollator walker when she is out at the [place].  Her right knee bothers a little bit but she has about doing surgery on I told her she had no pain we would she says it does not hurt enough to consider that.  Encourage her to do exercises that she is done for her knee replacement on that side as well.      Subjective   Medications:   Home Medications:  Current Outpatient Medications on File Prior to Visit   Medication Sig   • azelastine (ASTELIN) 0.1 % nasal spray USE 1 TO 2 PUFFS IN EACH NOSTRIL AT BEDTIME   • clotrimazole-betamethasone (LOTRISONE) 1-0.05 % cream Apply 1 application topically to the appropriate area as directed As Needed.   • Digestive Enzymes (DIGESTIVE ENZYME PO) Take 2 each by mouth 2 (Two) Times a Day.   • levocetirizine (XYZAL) 5 MG tablet Take 5 mg by mouth Every Evening.   • levothyroxine (SYNTHROID, LEVOTHROID) 25 MCG tablet Take 25 mcg by mouth 2 (Two) Times a Day.   • liothyronine (CYTOMEL) 5 MCG tablet Take 7.5 mcg by mouth Every Night.   • melatonin 3 MG tablet Take 3 mg by mouth At Night As Needed for Sleep.   • metoprolol succinate XL (TOPROL-XL) 50 MG 24 hr tablet Take 50 mg by mouth Every Night.   • multivitamin with minerals (Multivitamin Women 50+) tablet tablet Take 1  tablet by mouth Daily.   • nystatin (MYCOSTATIN) 283634 UNIT/GM ointment Apply  topically to the appropriate area as directed See Admin Instructions. Apply topically to the affected area twice daily   • Pregnenolone Micronized (PREGNENOLONE PO) Take 10 mg by mouth Daily.   • Probiotic Product (PROBIOTIC PO) Take 1 capsule by mouth Daily.   • aspirin 81 MG chewable tablet TAKE 1 TABLET BY MOUTH TWICE A DAY. FOLLOW UP WITH ORTHOPEDIC SURGEON FOR FURTHER GUIDANCE.   • clobetasol (TEMOVATE) 0.05 % ointment Apply 1 application topically to the appropriate area as directed 2 (Two) Times a Day. For up to 2 weeks   • docusate sodium (COLACE) 50 MG capsule Take  by mouth 2 (Two) Times a Day.   • HYDROcodone-acetaminophen (NORCO) 7.5-325 MG per tablet Take 1-2 tablets po q4-6hr PRN severe pain   • multivitamin (MULTI-VITAMIN PO) Take 1 tablet by mouth Daily.   • nystatin (MYCOSTATIN) 821316 UNIT/GM powder    • traMADol (ULTRAM) 50 MG tablet Take 1 tablet by mouth Every 6 (Six) Hours As Needed for Moderate Pain .     Current Facility-Administered Medications on File Prior to Visit   Medication   • Chlorhexidine Gluconate Cloth 2 % pads 1 each     Current Medications:  Scheduled Meds:  Continuous Infusions:No current facility-administered medications for this visit.    PRN Meds:.    I have reviewed the patient's medical history in detail and updated the computerized patient record.  Review and summarization of old records includes:    Past Medical History:   Diagnosis Date   • Abnormal heart rate     SVT   • Arthritis    • Disease of thyroid gland    • History of jock itch     uses clotrimazole cream   • Hypertension    • Knee pain    • Mild dietary indigestion     TAKES DIGESTIVE ENZYMES AND IF SHE DOESN'T HAS A LOT OF GAS AND BLOATING         Past Surgical History:   Procedure Laterality Date   • CATARACT EXTRACTION EXTRACAPSULAR W/ INTRAOCULAR LENS IMPLANTATION Bilateral    • COLONOSCOPY     • MASTOID SURGERY      as a child    • TONSILLECTOMY     • TOTAL KNEE ARTHROPLASTY Left 6/7/2021    Procedure: LEFT TOTAL KNEE ARTHROPLASTY WITH CORINNE NAVIGATION;  Surgeon: Dinesh Rahman MD;  Location: [facility];  Service: Orthopedics;  Laterality: Left;        Social History     Occupational History   • Not on file   Tobacco Use   • Smoking status: Never Smoker   • Smokeless tobacco: Never Used   Vaping Use   • Vaping Use: Never used   Substance and Sexual Activity   • Alcohol use: Yes     Comment: couple yearly   • Drug use: Never   • Sexual activity: Defer      Social History     Social History Narrative   • Not on file        Family History   Problem Relation Age of Onset   • Coronary artery disease Paternal Grandfather    • Coronary artery disease Maternal Grandfather    • Breast cancer Maternal Aunt    • Lymphoma Brother    • Ovarian cancer Neg Hx    • Uterine cancer Neg Hx    • Colon cancer Neg Hx    • Malig Hyperthermia Neg Hx        ROS: 14 point review of systems was performed and all other systems were reviewed and are negative except for documented findings in HPI and today's encounter.     Allergies:   Allergies   Allergen Reactions   • Seasonal Ic [Cholestatin] Other (See Comments)     Constitutional:  Denies fever, shaking or chills   Eyes:  Denies change in visual acuity   HENT:  Denies nasal congestion or sore throat   Respiratory:  Denies cough or shortness of breath   Cardiovascular:  Denies chest pain or severe LE edema   GI:  Denies abdominal pain, nausea, vomiting, bloody stools or diarrhea   Musculoskeletal:  Numbness, tingling, pain, or loss of motor function only as noted above in history of present illness.  : Denies painful urination or hematuria  Integument:  Denies rash, lesion or ulceration   Neurologic:  Denies headache or focal weakness  Endocrine:  Denies lymphadenopathy  Psych:  Denies confusion or change in mental status   Hem:  Denies active bleeding    OBJECTIVE:  Physical Exam: 86 y.o. female  Wt  "Readings from Last 3 Encounters:   12/15/21 71.7 kg (158 lb)   12/10/21 72.8 kg (160 lb 6.4 oz)   12/02/21 72.6 kg (160 lb)     Ht Readings from Last 1 Encounters:   12/15/21 174 cm (68.5\")     Body mass index is 23.67 kg/m².  Vitals:    12/15/21 1454   Resp: 18   Temp: 97.1 °F (36.2 °C)     Vital signs reviewed.     General Appearance:    Alert, cooperative, in no acute distress                  Eyes: conjunctiva clear  ENT: external ears and nose atraumatic  CV: no peripheral edema  Resp: normal respiratory effort  Skin: no rashes or wounds; normal turgor  Psych: mood and affect appropriate  Lymph: no nodes appreciated  Neuro: gross sensation intact  Vascular:  Palpable peripheral pulse in noted extremity  Musculoskeletal Extremities: As above    Radiology:   AP lateral sunrise view left knee taken the office today with comparison views for postop follow-up show well aligned total knee arthroplasty        Assessment:     ICD-10-CM ICD-9-CM   1. S/P TKR (total knee replacement), left  Z96.652 V43.65        MDM/Plan:   The diagnosis(es), natural history, pathophysiology and treatment for diagnosis(es) were discussed. Opportunity given and questions answered.  Biomechanics of pertinent body areas discussed.  When appropriate, the use of ambulatory aids discussed.    HOME EXERCISE/PT program encouraged  TOTAL JOINT recommendations and precautions, including antibiotic prophylaxis discussed. Advice given and questions answered.       12/15/2021    Dictated utilizing Dragon dictation    "

## 2022-01-18 ENCOUNTER — TELEPHONE (OUTPATIENT)
Dept: ORTHOPEDIC SURGERY | Facility: CLINIC | Age: 87
End: 2022-01-18

## 2022-01-18 NOTE — TELEPHONE ENCOUNTER
"Provider: ROHAN MORRIS  Caller: AMADOR ROLLINS  Relationship to Patient: SELF  Phone Number: [phone]  Reason for Call: PATIENT IS REQUESTING TO SPEAK WITH ROHAN. SHE STATES:     \"HAD KNEE SURGERY 06/01/21. LOOKING WELL ETC... BUT THIS LAST WEEK, KNEE SOMETIMES FEEL LIKE A SEMI-TGHT BANDAGE IS ON IT. WHEN I MOVE IT ETC... THE FEELING GOES AWAY. I'M TRYING TO STILL EXERCISE, BUT HAVEN'T BEEN AS MUCH AS I'D LIKE TO DUE TO COLD WEATHER AND A PULLED TENDON ON THE OTHER LEG. I'M JUST CONCERNED THERE'S SOME SCAR TISSUE BUILT UP OR IS THIS NORMAL? PLEASE GIVE ME A CALL BACK.\"  - AMADOR ROLLINS    PLEASE ADVISE.  "

## 2022-01-19 ENCOUNTER — TELEPHONE (OUTPATIENT)
Dept: ORTHOPEDIC SURGERY | Facility: CLINIC | Age: 87
End: 2022-01-19

## 2022-01-19 NOTE — TELEPHONE ENCOUNTER
Caller: AMADOR  Relationship to Patient: SELF    Phone Number: [phone]  Reason for Call:PT RETURNING PHONE CALL TO ROHAN MORRIS PT STATES THAT A MESSAGE CAN BE SENT TO HER ON [employer] AS WELL IF THAT IS EASIER.

## 2022-01-26 ENCOUNTER — OFFICE VISIT (OUTPATIENT)
Dept: ORTHOPEDIC SURGERY | Facility: CLINIC | Age: 87
End: 2022-01-26

## 2022-01-26 VITALS — HEIGHT: 68 IN | BODY MASS INDEX: 24.4 KG/M2 | TEMPERATURE: 97 F | WEIGHT: 161 LBS

## 2022-01-26 DIAGNOSIS — M54.2 CERVICAL SPINE PAIN: Primary | ICD-10-CM

## 2022-01-26 DIAGNOSIS — M54.2 NECK PAIN: ICD-10-CM

## 2022-01-26 PROCEDURE — 99213 OFFICE O/P EST LOW 20 MIN: CPT | Performed by: ORTHOPAEDIC SURGERY

## 2022-01-26 PROCEDURE — 72040 X-RAY EXAM NECK SPINE 2-3 VW: CPT | Performed by: ORTHOPAEDIC SURGERY

## 2022-01-26 NOTE — PROGRESS NOTES
New patient or new problem visit    CC: Neck pain    HPI: She complains of chronic neck pain and tightness.  No balance issues no arm pain numbness tingling or weakness.  Has had no specific treatment.  No constitutional symptoms suggestive of recent sickness.    PFSH: See attached    ROS: As above    PE: BMI is 24.5.  She does not look 86 years old.  Upper extremity strength is normal.  Sensation is intact.  The neck is mildly tender in the mid cervical region with mild spasm on either side.  Motion may be slightly restricted.    XRAY: Plain film x-rays of cervical spine show complete loss of lordosis and straightening of the spine and mild degenerative changes but looks pretty good for 86.  No comparison views are available.    Other: n/a    Impression: Cervical spondylosis    Plan: Physical therapy.  Follow-up as needed

## 2022-02-08 ENCOUNTER — TELEPHONE (OUTPATIENT)
Dept: ORTHOPEDIC SURGERY | Facility: CLINIC | Age: 87
End: 2022-02-08

## 2022-02-08 NOTE — TELEPHONE ENCOUNTER
Caller: AMADOR ROLLINS    Relationship: SELF    Best call back number: [phone]    What form or medical record are you requesting: NECK THERAPY ORDER    Who is requesting this form or medical record from you: CHAIM    How would you like to receive the form or medical records (pick-up, mail, fax): [phone]    Timeframe paperwork needed: ASAP    Additional notes: NA

## 2022-02-14 ENCOUNTER — TELEPHONE (OUTPATIENT)
Dept: ORTHOPEDIC SURGERY | Facility: CLINIC | Age: 87
End: 2022-02-14

## 2022-02-14 NOTE — TELEPHONE ENCOUNTER
Provider: JOVANY  Caller: AMADOR  Phone Number: [phone]  Reason for Call: PHYSICAL THERAPIST CAME TODAY AND WANTED TO LOOK AT THE NECK X-RAY AND READ YOUR ASSESSMENT.      PT IS ASKING IF WE COULD HAVE THAT IN THE MESSAGES ON HER ON [employer] SO SHE CAN BRING IT UP FOR HER    PHONE NUMBER FOR HER PHYSICAL THERAPIST IF YOU COULD REACH OUT AND ANSWER ANY OF HER QUESTIONS  [phone]

## 2022-02-15 NOTE — TELEPHONE ENCOUNTER
I do not have anything to do with where these things end up in the chart.  I dictated note in my x-ray assessment and narratives.  You can send it to whomever needs to receive including those treating her if they are cleared by hip.  This is not my job to figure out.

## 2022-02-15 NOTE — TELEPHONE ENCOUNTER
I HAVE TRIED TO CALL THE pot AND THE NUMBER IS WRONG AND THE NUMBER FOR THE PATIENT NO ONE IS ANSWERING.

## 2022-06-15 ENCOUNTER — TRANSCRIBE ORDERS (OUTPATIENT)
Dept: ADMINISTRATIVE | Facility: HOSPITAL | Age: 87
End: 2022-06-15

## 2022-06-15 DIAGNOSIS — M25.511 RIGHT SHOULDER PAIN, UNSPECIFIED CHRONICITY: ICD-10-CM

## 2022-06-15 DIAGNOSIS — D17.20 BENIGN LIPOMATOUS NEOPLASM OF SKIN AND SUBCUTANEOUS TISSUE OF UNSPECIFIED LIMB: Primary | ICD-10-CM

## 2022-06-29 ENCOUNTER — APPOINTMENT (OUTPATIENT)
Dept: MRI IMAGING | Facility: HOSPITAL | Age: 87
End: 2022-06-29

## 2022-07-07 ENCOUNTER — HOSPITAL ENCOUNTER (OUTPATIENT)
Dept: MRI IMAGING | Facility: HOSPITAL | Age: 87
Discharge: HOME | End: 2022-07-07
Admitting: FAMILY MEDICINE

## 2022-07-07 DIAGNOSIS — M25.511 RIGHT SHOULDER PAIN, UNSPECIFIED CHRONICITY: ICD-10-CM

## 2022-07-07 PROCEDURE — A9577 INJ MULTIHANCE: HCPCS | Performed by: FAMILY MEDICINE

## 2022-07-07 PROCEDURE — 0 GADOBENATE DIMEGLUMINE 529 MG/ML SOLUTION: Performed by: FAMILY MEDICINE

## 2022-07-07 PROCEDURE — 73223 MRI JOINT UPR EXTR W/O&W/DYE: CPT

## 2022-07-07 PROCEDURE — 82565 ASSAY OF CREATININE: CPT

## 2022-07-07 RX ADMIN — GADOBENATE DIMEGLUMINE 15 ML: 529 INJECTION, SOLUTION INTRAVENOUS at 15:25

## 2022-08-05 ENCOUNTER — OFFICE VISIT (OUTPATIENT)
Dept: OBSTETRICS AND GYNECOLOGY | Age: 87
End: 2022-08-05

## 2022-08-05 VITALS
SYSTOLIC BLOOD PRESSURE: 128 MMHG | DIASTOLIC BLOOD PRESSURE: 80 MMHG | HEIGHT: 68 IN | WEIGHT: 157.4 LBS | BODY MASS INDEX: 23.86 KG/M2

## 2022-08-05 DIAGNOSIS — N63.21 MASS OF UPPER OUTER QUADRANT OF LEFT BREAST: Primary | ICD-10-CM

## 2022-08-05 PROCEDURE — 99213 OFFICE O/P EST LOW 20 MIN: CPT | Performed by: OBSTETRICS & GYNECOLOGY

## 2022-08-05 NOTE — PROGRESS NOTES
"Chief Complaint   Patient presents with   • Follow-up     GYN F/U for Left Breast Lump        HPI  Madina Coughlin is a 87 y.o. female presents for further evaluation of a left breast lump. She notes she felt her left lateral breast last week and thought she felt a BB sized lump. She denies pain or tenderness.         The following portions of the patient's history were reviewed and updated as appropriate: allergies, current medications, past family history, past medical history, past social history, past surgical history and problem list.    Review of Systems  Pertinent items are noted in HPI.    /80   Ht 172.7 cm (68\")   Wt 71.4 kg (157 lb 6.4 oz)   LMP  (LMP Unknown)   Breastfeeding No   BMI 23.93 kg/m²         Physical Exam  Constitutional:       Appearance: Normal appearance.   Pulmonary:      Effort: Pulmonary effort is normal.   Neurological:      General: No focal deficit present.      Mental Status: She is alert and oriented to person, place, and time.   Psychiatric:         Mood and Affect: Mood normal.         Behavior: Behavior normal.     Breasts: negative inspection. Examined in sitting and supine positions. Left breast with 0.5 cm lump in the upper outer quadrant at 1:00, 5 cm from nipple. No retractions, nipple discharge or axillary adenopathy noted. Right breast is without masses, retractions, nipple discharge or axillary adenopathy.         Diagnoses and all orders for this visit:    1. Mass of upper outer quadrant of left breast (Primary)  -     Mammo diagnostic digital tomosynthesis bilateral w CAD; Future  -     US breast left limited; Future  -     Ambulatory Referral to Breast Surgery      Recommend pt book f/u appt 4 weeks to re-assess and review imaging and surgery referral.           "

## 2022-08-16 ENCOUNTER — APPOINTMENT (OUTPATIENT)
Dept: WOMENS IMAGING | Facility: HOSPITAL | Age: 87
End: 2022-08-16

## 2022-08-16 PROCEDURE — 76642 ULTRASOUND BREAST LIMITED: CPT | Performed by: RADIOLOGY

## 2022-08-16 PROCEDURE — 77066 DX MAMMO INCL CAD BI: CPT | Performed by: RADIOLOGY

## 2022-08-16 PROCEDURE — G0279 TOMOSYNTHESIS, MAMMO: HCPCS | Performed by: RADIOLOGY

## 2022-08-17 ENCOUNTER — TELEPHONE (OUTPATIENT)
Dept: OBSTETRICS AND GYNECOLOGY | Age: 87
End: 2022-08-17

## 2022-08-17 DIAGNOSIS — R92.8 ABNORMAL FINDING ON BREAST IMAGING: Primary | ICD-10-CM

## 2022-08-17 NOTE — TELEPHONE ENCOUNTER
Called Madina and discussed her breast imaging results. Discussed that she needs to return to [facility] for a breast cyst aspiration. This has been ordered and she agrees to book. Discussed that the radiologist did not find any abnormality at the site of her lump and so f/u with a breast surgeon for further evaluation is needed if lump persists. This has been ordered. Pt notes understanding.

## 2022-08-17 NOTE — TELEPHONE ENCOUNTER
[facility] - Needs  an order for FNA fine needle aspiration of a  left breast post bilateral  diagnostic mammogram if needed . They are faxing a report to you .

## 2022-09-07 ENCOUNTER — APPOINTMENT (OUTPATIENT)
Dept: WOMENS IMAGING | Facility: HOSPITAL | Age: 87
End: 2022-09-07

## 2022-09-07 PROCEDURE — 76942 ECHO GUIDE FOR BIOPSY: CPT | Performed by: RADIOLOGY

## 2022-09-07 PROCEDURE — 19000 PUNCTURE ASPIR CYST BREAST: CPT | Performed by: RADIOLOGY

## 2022-09-14 ENCOUNTER — TELEPHONE (OUTPATIENT)
Dept: OBSTETRICS AND GYNECOLOGY | Age: 87
End: 2022-09-14

## 2022-09-14 NOTE — TELEPHONE ENCOUNTER
Caller: AMADOR ROLLINS    Relationship: SELF    Best call back number: [phone]    What is the best time to reach you: ANYTIME    Who are you requesting to speak with (clinical staff, provider,  specific staff member):        What was the call regarding: PT STATES SURGERY SCHEDULER CALLED TO MAKE APPT.. PT WANTS TO KNOW SOME FURTHER DETAILS ON WHY SHE WOULD NEED IT DONE.

## 2022-09-14 NOTE — TELEPHONE ENCOUNTER
Called pt and discussed. Advised the radiologist recommended surgical consultation regarding her palpable lump. The breast cyst was an incidental finding that resolved with aspiration however her lump was not explained with imaging and so surgical consult is still indicated. Pt notes understanding.

## 2022-09-14 NOTE — TELEPHONE ENCOUNTER
Please call pt, she still needs consult regarding the breast lump that prompted her imaging. The cyst was an incidental finding. The radiologist advised surgical consult regarding her lump as it was not explained with imaging. The cyst was a separate finding at the time of imaging. The cyst resolved with aspiration.

## 2022-10-04 LAB — CREAT BLDA-MCNC: 0.7 MG/DL (ref 0.6–1.3)

## 2022-10-11 ENCOUNTER — OFFICE VISIT (OUTPATIENT)
Dept: OBSTETRICS AND GYNECOLOGY | Age: 87
End: 2022-10-11

## 2022-10-11 VITALS
WEIGHT: 159.6 LBS | BODY MASS INDEX: 24.19 KG/M2 | DIASTOLIC BLOOD PRESSURE: 80 MMHG | SYSTOLIC BLOOD PRESSURE: 142 MMHG | HEIGHT: 68 IN

## 2022-10-11 DIAGNOSIS — N63.21 MASS OF UPPER OUTER QUADRANT OF LEFT BREAST: ICD-10-CM

## 2022-10-11 DIAGNOSIS — Z01.419 ENCOUNTER FOR GYNECOLOGICAL EXAMINATION: Primary | ICD-10-CM

## 2022-10-11 PROCEDURE — G0101 CA SCREEN;PELVIC/BREAST EXAM: HCPCS | Performed by: OBSTETRICS & GYNECOLOGY

## 2022-10-11 NOTE — PROGRESS NOTES
".  Subjective     Chief Complaint   Patient presents with   • Gynecologic Exam     Annual exam, Last Pap unknown, Last Mammogram 2022, Pt has no complaints today        History of Present Illness    Madina Coughlin is a 87 y.o.  who presents for annual exam.    She can still feel a small lump in her left breast. It is unchanged.  She denies vag bleeding or pelvic pain.     Obstetric History:  OB History        5    Para   4    Term   4            AB   1    Living   3       SAB   1    IAB        Ectopic        Molar        Multiple        Live Births   3               Menstrual History:     No LMP recorded (lmp unknown). Patient is postmenopausal.         Current contraception: post menopausal status  History of abnormal Pap smear: no  Received Gardasil immunization: no  Perform regular self breast exam: yes  Family history of uterine or ovarian cancer: mat aunt-uterine  Family History of colon cancer: no  Family history of breast cancer: yes - mat aunt    Mammogram: up to date.  Colonoscopy: pt reports they advised no further screening  DEXA: pt declines    Exercise: moderately active  Calcium/Vitamin D: adequate intake    The following portions of the patient's history were reviewed and updated as appropriate: allergies, current medications, past family history, past medical history, past social history, past surgical history and problem list.    Review of Systems    Review of Systems   Constitutional: Negative for fatigue.   Respiratory: Negative for shortness of breath.    Gastrointestinal: Negative for abdominal pain.   Genitourinary: Negative for vag bleeding or urinary incontinence  Neurological: Negative for headaches.   Psychiatric/Behavioral: Negative for dysphoric mood.   Breast: pos for lump outer left breast        Objective   Physical Exam    /80   Ht 172.7 cm (68\")   Wt 72.4 kg (159 lb 9.6 oz)   LMP  (LMP Unknown)   BMI 24.27 kg/m²   General:   Alert, in no distress "   Heart: regular rate and rhythm   Lungs: clear to auscultation bilaterally   Breast: Inspection is negative; right breast is without masses, retractions, nipple discharge or axillary adenopathy. Left breast with 0.5 cm smooth lump in upper outer quadrant, no retractions, nipple discharge or axillary adenopathy.    Neck: supple   Abdomen: Soft, no tenderness or guarding   Pelvis: External genitalia: normal general appearance  Urinary system: urethral meatus normal  Vaginal: atrophic mucosa without prolapse or lesions  Cervix: normal appearance  Adnexa: no masses or tenderness  Uterus: normal, nontender   Extremities: Normal without edema   Neurologic: Alert and oriented   Psychiatric: Normal affect, judgment and mood     Assessment & Plan   Diagnoses and all orders for this visit:    1. Encounter for gynecological examination (Primary)    2. Mass of upper outer quadrant of left breast        All questions answered.  Breast self exam technique reviewed and patient encouraged to perform self-exam monthly.  Discussed healthy lifestyle modifications.  Recommended 30 minutes of aerobic exercise five times per week.  Discussed calcium needs to prevent osteoporosis.  Pap def as not indicated and pt agrees.     Pt has f/u appt with gen surgery for further evaluation of breast lump. Reviewed breast imaging which was benign. She did have cyst aspiration for incidental finding. The radiologist advised routine f/u breast imaging regarding cyst as it resolved with aspiration.

## 2022-10-24 NOTE — PROGRESS NOTES
GENERAL SURGERY HISTORY AND PHYSICAL     SUMMARY:  87 y.o. F with a new diagnosis of patient concern of left breast mass.    (1) Patient concern of left breast mass:  - On physical exam today, no masses, skin changes, or nipple abnormalities. She does have dense tissue at the area she localized, but no mass is appreciated.  - Diagnostic Mammogram and Left breast US showed no correlation. Discussed with patient could order a breast MRI for further evaluation, she declines at this time.  - Recommend continued self breast exams.  - Follow-up in 6mo for repeat breast exam. Call in the interim if any new symptoms should develop.     (2) Abnormal breast imaging:  - Diagnostic Mammogram and Left breast US 08/2022 reviewed, BIRADS 4A.   - Patient underwent cyst aspiration and resulted in benign fluid.   - Recommend routine yearly screening mammograms (due 08/2023).    Referring Provider: Nicole Wong MD    Chief complaint: breast mass    HPI: Ms. Madina Coughlin is a 88 yo woman, seen at the request of Nicole Wong MD, for a new diagnosis of patient concern for left breast mass.      She states she feels a mass in her left breast. She discussed it with her GYN who sent her here for further evaluation. Her work-up is detailed in the breast history section below. She has had regular annual mammograms, expect for the past few years. She denies any prior history of abnormal mammograms other than her most recent mammogram. She underwent a cyst aspiration that was benign. She denies any history of breast biopsies. She denies any breast pain, skin changes, or nipple discharge. She has a family history of breast cancer in her maternal aunt. She denies any family history of ovarian cancer.     TIMELINE OF WORKUP:  8/16/2022 Bilateral Diagnostic Mammogram with Ramsey, Left breast limited breast US:  There are scattered areas of fibroglandular density.  Finding 1: The patient indicates a palpable lump in the upper outer  region of the left breast.  The symptomatic region is clearly marked and there is no suspicious finding in the region of clinical concern.  No masses, microcalcifications or architectural distortions are identified.  Finding 2: There is an oval mass measuring 7 mm with circumscribed margins seen in the posterior one third region of the left breast at 6:00 located 5 cm from the nipple.  This is not noted to be palpable.  In the right breast, no suspicious masses, significant calcifications or other abnormalities are seen.  On ultrasound,  Finding 1: There is no evidence of any solid mass or abnormal cystic elements.  Finding 2: Ultrasound demonstrates an oval anechoic simple cyst with well-defined, thin margins measuring 7 x 5 x 6 mm seen in the posterior one third region of the left breast at 6:00 located 5 cm the nipple.  No internal vascularity identified by Doppler.  However, this is an isolated cyst.  Impression:  Finding 1: Area in the left breast is benign-negative.  In view of the negative findings, clinical follow-up is recommended as needed.  Should not persist or worsen, surgical consultation would be recommended.  Finding 2: Simple cyst in the left breast is suspicious.  Ultrasound-guided cyst aspiration is recommended.  A postprocedure mammogram is recommended.  BI-RADS Category 4A: Suspicious abnormality    2022 Ultrasound-guided cyst aspiration, left breast:  Under sonographic visualization, a 21-gauge needle was inserted into the center of the cyst at the 6 o'clock position.  Approximately 1/4 cc of yellow fluid extended into the syringe.  The walls of the cyst collapsed.  There is complete resolution of the cyst.  Patient tolerated the procedure well and there were no complications. Given normal appearance, the fluid was discarded.  Return to screening mammogram is recommended.  Impression: Return to screening mammogram is recommended.    MEDICAL HISTORY:     Gynecologic History:   . P:4  AB:1  Age at first childbirth: 24  Lactation/How long: a little   Age at menarche: 15  Age at menopause: late 40s  Total years of oral contraceptive use: 0  Total years of hormone replacement therapy: 0    Past Medical History:   Past Medical History:   Diagnosis Date   • Abnormal heart rate     SVT   • Allergic rhinitis    • Arthritis    • Disease of thyroid gland    • History of jock itch     uses clotrimazole cream   • Hypertension    • Knee pain    • Mild dietary indigestion     TAKES DIGESTIVE ENZYMES AND IF SHE DOESN'T HAS A LOT OF GAS AND BLOATING       Past Surgical History:    Past Surgical History:   Procedure Laterality Date   • CATARACT EXTRACTION EXTRACAPSULAR W/ INTRAOCULAR LENS IMPLANTATION Bilateral    • COLONOSCOPY N/A 2017   • MASTOID SURGERY      as a child   • TONSILLECTOMY Bilateral    • TOTAL KNEE ARTHROPLASTY Left 06/07/2021    Procedure: LEFT TOTAL KNEE ARTHROPLASTY WITH Corpsolv NAVIGATION;  Surgeon: Dinesh Rahman MD;  Location: [facility];  Service: Orthopedics;  Laterality: Left;   • US GUIDED CYST ASPIRATION BREAST N/A 09/07/2022    Left breast     Family History:    Family History   Problem Relation Age of Onset   • Lymphoma Brother    • Coronary artery disease Paternal Grandfather    • Coronary artery disease Maternal Grandfather    • Breast cancer Maternal Aunt    • Uterine cancer Maternal Aunt    • Ovarian cancer Neg Hx    • Colon cancer Neg Hx    • Malig Hyperthermia Neg Hx      Social History:   Social History     Socioeconomic History   • Marital status:    Tobacco Use   • Smoking status: Never     Passive exposure: Never   • Smokeless tobacco: Never   Vaping Use   • Vaping Use: Never used   Substance and Sexual Activity   • Alcohol use: Yes     Comment: couple yearly   • Drug use: Never   • Sexual activity: Defer     • Caffeine - denies     ALLERGIES:   No Known Allergies    MEDICATIONS:     Current Outpatient Medications:   •  azelastine (ASTELIN) 0.1 % nasal spray,  1 spray into the nostril(s) as directed by provider 2 (Two) Times a Day., Disp: , Rfl: 5  •  clobetasol (TEMOVATE) 0.05 % ointment, Apply 1 application topically to the appropriate area as directed 2 (Two) Times a Day. For up to 2 weeks (Patient taking differently: Apply 1 application topically to the appropriate area as directed As Needed. For up to 2 weeks), Disp: 15 g, Rfl: 1  •  Digestive Enzymes (DIGESTIVE ENZYME PO), Take 2 each by mouth 2 (Two) Times a Day., Disp: , Rfl:   •  levocetirizine (XYZAL) 5 MG tablet, Take 5 mg by mouth Every Evening., Disp: , Rfl:   •  levothyroxine (SYNTHROID, LEVOTHROID) 25 MCG tablet, Take 25 mcg by mouth 2 (Two) Times a Day., Disp: , Rfl:   •  liothyronine (CYTOMEL) 5 MCG tablet, Take 7.5 mcg by mouth Every Night., Disp: , Rfl:   •  lisinopril (PRINIVIL,ZESTRIL) 10 MG tablet, Take 1 tablet by mouth Daily., Disp: , Rfl:   •  melatonin 3 MG tablet, Take 3 mg by mouth At Night As Needed for Sleep., Disp: , Rfl:   •  metoprolol succinate XL (TOPROL-XL) 50 MG 24 hr tablet, Take 50 mg by mouth Every Night., Disp: , Rfl:   •  multivitamin with minerals tablet tablet, Take 1 tablet by mouth Daily., Disp: , Rfl:   •  Pregnenolone Micronized (PREGNENOLONE PO), Take 10 mg by mouth Daily., Disp: , Rfl:   •  Probiotic Product (PROBIOTIC PO), Take 1 capsule by mouth Daily., Disp: , Rfl:     LABS:    • No recent labs.     ROS:   Influenza-like illness: no fever, no cough, no sore throat, no body aches, no loss of sense of taste or smell, no known exposure to person with Covid-19.  Constitutional: Negative for fevers or chills  HENT: Negative for hearing loss or runny nose  Eyes: Negative for vision changes or scleral icterus  Respiratory: Negative for cough or shortness of breath  Cardiovascular: Negative for chest pain or heart palpitations  Gastrointestinal: Negative for abdominal pain, nausea, vomiting, constipation, melena, or hematochezia  Genitourinary: Negative for hematuria or  dysuria  Musculoskeletal: Negative for joint swelling or gait instability  Neurologic: Negative for tremors or seizures  Psychiatric: Negative for suicidal ideations or depression  All other systems reviewed and negative    PHYSICAL EXAM:   • ECO - Symptomatic but completely ambulatory  • Constitutional: Well-developed, well-nourished, no acute distress  • Eyes: Conjunctiva normal, sclera nonicteric  • ENMT: Hearing grossly normal, oral mucosa moist  • Neck: Supple, no palpable mass, trachea midline  • Respiratory: Clear to auscultation, normal inspiratory effort  • Cardiovascular: Regular rate, no murmur, no peripheral edema, no jugular venous distention  • Breast: symmetric  o Right: No visible abnormalities on inspection while seated, with arms raised or hands on hips. No masses, skin changes, or nipple abnormalities.  o Left: No visible abnormalities on inspection while seated, with arms raised or hands on hips. No masses, skin changes, or nipple abnormalities. Dense tissue noted where patient is concerned at 2:00, no mass.   o No clinical chest wall involvement.  • Lymphatics (palpable nodes): No cervical, supraclavicular or axillary lymphadenopathy  • Skin: Warm, dry, no rash on visualized skin surfaces  • Musculoskeletal: Symmetric strength, normal gait  • Psychiatric: Alert and oriented ×3, normal affect     Paula Victor PA-C  General Surgery    [facility]  [address], [address]  [place], [place], [address]  P: [phone]  F: [phone]

## 2022-10-25 ENCOUNTER — OFFICE VISIT (OUTPATIENT)
Dept: SURGERY | Facility: CLINIC | Age: 87
End: 2022-10-25

## 2022-10-25 VITALS — WEIGHT: 162 LBS | HEIGHT: 69 IN | BODY MASS INDEX: 23.99 KG/M2

## 2022-10-25 DIAGNOSIS — R92.8 ABNORMAL FINDING ON BREAST IMAGING: Primary | ICD-10-CM

## 2022-10-25 PROCEDURE — 99203 OFFICE O/P NEW LOW 30 MIN: CPT

## 2022-10-25 RX ORDER — LISINOPRIL 10 MG/1
10 TABLET ORAL DAILY
COMMUNITY

## 2023-05-09 NOTE — PROGRESS NOTES
ASSESSMENT/PLAN:   87 y.o. female with:    (1) Patient concern of left breast mass:  - On physical exam today, no changes since prior exam. No masses noted, area of dense tissue at 2:00.  - Diagnostic Mammogram and Left breast US showed no correlation.  - Recommend continued self breast exams. Call the office if any changes in breast exam.  - Recommend annual screening mammogram (due 08/2023). Order placed. I will call her with the results.   - Discussed follow-up for repeat breast exam after mammogram. She would like to wait until her mammogram results before deciding on coming back into the office for repeat breast exam.    (2) Abnormal breast imaging:  - Diagnostic Mammogram and Left breast US 08/2022 reviewed, BIRADS 4A.   - Patient underwent cyst aspiration and resulted in benign fluid.   - Recommend return to yearly screening mammogram (due 08/2023).    Chief complaint: breast mass    HPI: Ms. Madina Coughlin is a 88 yo woman, seen at the request of No ref. provider found, for a new diagnosis of patient concern for left breast mass. She states she feels a mass in her left breast. She discussed it with her GYN who sent her here for further evaluation. Her work-up is detailed in the breast history section below. She has had regular annual mammograms, expect for the past few years. She denies any prior history of abnormal mammograms other than her most recent mammogram. She underwent a cyst aspiration that was benign. She denies any history of breast biopsies. She denies any breast pain, skin changes, or nipple discharge. She has a family history of breast cancer in her maternal aunt. She denies any family history of ovarian cancer.     5/10/2023 She presents today for a follow-up. She denies any changes in her breast exam. No other new health updates.     TIMELINE OF WORKUP:  8/16/2022 Bilateral Diagnostic Mammogram with Ramsey, Left breast limited breast US:  There are scattered areas of fibroglandular  density.  Finding 1: The patient indicates a palpable lump in the upper outer region of the left breast.  The symptomatic region is clearly marked and there is no suspicious finding in the region of clinical concern.  No masses, microcalcifications or architectural distortions are identified.  Finding 2: There is an oval mass measuring 7 mm with circumscribed margins seen in the posterior one third region of the left breast at 6:00 located 5 cm from the nipple.  This is not noted to be palpable.  In the right breast, no suspicious masses, significant calcifications or other abnormalities are seen.  On ultrasound,  Finding 1: There is no evidence of any solid mass or abnormal cystic elements.  Finding 2: Ultrasound demonstrates an oval anechoic simple cyst with well-defined, thin margins measuring 7 x 5 x 6 mm seen in the posterior one third region of the left breast at 6:00 located 5 cm the nipple.  No internal vascularity identified by Doppler.  However, this is an isolated cyst.  Impression:  Finding 1: Area in the left breast is benign-negative.  In view of the negative findings, clinical follow-up is recommended as needed.  Should a lump persist or worsen, surgical consultation would be recommended.  Finding 2: Simple cyst in the left breast is suspicious.  Ultrasound-guided cyst aspiration is recommended.  A postprocedure mammogram is recommended.  BI-RADS Category 4A: Suspicious abnormality    9/7/2022 Ultrasound-guided cyst aspiration, left breast:  Under sonographic visualization, a 21-gauge needle was inserted into the center of the cyst at the 6 o'clock position.  Approximately 1/4 cc of yellow fluid extended into the syringe. The walls of the cyst collapsed.  There is complete resolution of the cyst.  Patient tolerated the procedure well and there were no complications. Given normal appearance, the fluid was discarded.  Return to screening mammogram is recommended.  Impression: Return to screening  mammogram is recommended.    MEDICAL HISTORY:     Gynecologic History:   . P:4 AB:1  Age at first childbirth: 24  Lactation/How long: a little   Age at menarche: 15  Age at menopause: late 40s  Total years of oral contraceptive use: 0  Total years of hormone replacement therapy: 0    Past Medical History:   Past Medical History:   Diagnosis Date   • Abnormal heart rate     SVT   • Allergic rhinitis    • Arthritis    • Disease of thyroid gland    • History of jock itch     uses clotrimazole cream   • Hypertension    • Knee pain    • Mild dietary indigestion     TAKES DIGESTIVE ENZYMES AND IF SHE DOESN'T HAS A LOT OF GAS AND BLOATING       Past Surgical History:    Past Surgical History:   Procedure Laterality Date   • CATARACT EXTRACTION EXTRACAPSULAR W/ INTRAOCULAR LENS IMPLANTATION Bilateral    • COLONOSCOPY N/A    • MASTOID SURGERY      as a child   • TONSILLECTOMY Bilateral    • TOTAL KNEE ARTHROPLASTY Left 2021    Procedure: LEFT TOTAL KNEE ARTHROPLASTY WITH CORINNE NAVIGATION;  Surgeon: Dinesh Rahman MD;  Location: [facility];  Service: Orthopedics;  Laterality: Left;   • US GUIDED CYST ASPIRATION BREAST N/A 2022    Left breast     Family History:    Family History   Problem Relation Age of Onset   • Lymphoma Brother    • Coronary artery disease Paternal Grandfather    • Coronary artery disease Maternal Grandfather    • Breast cancer Maternal Aunt    • Uterine cancer Maternal Aunt    • Ovarian cancer Neg Hx    • Colon cancer Neg Hx    • Malig Hyperthermia Neg Hx      Social History:   Social History     Socioeconomic History   • Marital status:    Tobacco Use   • Smoking status: Never     Passive exposure: Never   • Smokeless tobacco: Never   Vaping Use   • Vaping Use: Never used   Substance and Sexual Activity   • Alcohol use: Yes     Comment: couple yearly   • Drug use: Never   • Sexual activity: Defer   Caffeine - denies     ALLERGIES:   No Known Allergies    MEDICATIONS:    · Azelastine  · Clobetasol  · Digestive enzyme  · Levocetirizine  · Levothyroxine  · Liothyronine  · Lisinopril  · Melatonin  · Metoprolol  · Multivitamin  · Pregnenolone  · Probiotic     LABS:    • No recent labs.     ROS:   Constitutional: Negative for fevers or chills  HENT: Negative for hearing loss or runny nose  Eyes: Negative for vision changes or scleral icterus  Respiratory: Negative for cough or shortness of breath  Cardiovascular: Negative for chest pain or heart palpitations  Gastrointestinal: Negative for abdominal pain, nausea, vomiting, constipation, melena, or hematochezia  Genitourinary: Negative for hematuria or dysuria  Musculoskeletal: Negative for joint swelling or gait instability  Neurologic: Negative for tremors or seizures  Psychiatric: Negative for suicidal ideations or depression  All other systems reviewed and negative    PHYSICAL EXAM:   • ECO - Symptomatic but completely ambulatory  • Constitutional: Well-developed, well-nourished, no acute distress  • Eyes: Conjunctiva normal, sclera nonicteric  • ENMT: Hearing grossly normal, oral mucosa moist  • Neck: Supple, no palpable mass, trachea midline  • Respiratory: Clear to auscultation, normal inspiratory effort  • Cardiovascular: Regular rate, no murmur, no peripheral edema, no jugular venous distention  • Breast: symmetric  o Right: No visible abnormalities on inspection while seated, with arms raised or hands on hips. No masses, skin changes, or nipple abnormalities.  o Left: No visible abnormalities on inspection while seated, with arms raised or hands on hips. No masses, skin changes, or nipple abnormalities. Dense tissue noted where patient noted abnormality at 2:00, no mass.   o No clinical chest wall involvement.  • Lymphatics (palpable nodes): No cervical, supraclavicular, or axillary lymphadenopathy  • Skin: Warm, dry, no rash on visualized skin surfaces  • Musculoskeletal: Symmetric strength, normal gait  • Psychiatric:  Alert and oriented ×3, normal affect     Paula Victor PA-C    [facility] - General Surgery   [address], [address]  [place] [address]    [address], [address]  [place] [address]    Office: [phone]  Fax: [phone]

## 2023-05-10 ENCOUNTER — OFFICE VISIT (OUTPATIENT)
Dept: SURGERY | Facility: CLINIC | Age: 88
End: 2023-05-10
Payer: MEDICARE

## 2023-05-10 VITALS — BODY MASS INDEX: 23.96 KG/M2 | HEIGHT: 69 IN | WEIGHT: 161.8 LBS

## 2023-05-10 DIAGNOSIS — Z12.39 ENCOUNTER FOR SCREENING BREAST EXAMINATION: Primary | ICD-10-CM

## 2023-05-10 DIAGNOSIS — Z12.31 ENCOUNTER FOR SCREENING MAMMOGRAM FOR MALIGNANT NEOPLASM OF BREAST: ICD-10-CM

## 2023-05-10 RX ORDER — AZELASTINE 1 MG/ML
2 SPRAY, METERED NASAL 2 TIMES DAILY
COMMUNITY

## 2023-07-23 ENCOUNTER — HOSPITAL ENCOUNTER (EMERGENCY)
Facility: HOSPITAL | Age: 88
Discharge: HOME | End: 2023-07-23
Attending: EMERGENCY MEDICINE | Admitting: EMERGENCY MEDICINE
Payer: MEDICARE

## 2023-07-23 ENCOUNTER — APPOINTMENT (OUTPATIENT)
Dept: CARDIOLOGY | Facility: HOSPITAL | Age: 88
End: 2023-07-23
Payer: MEDICARE

## 2023-07-23 ENCOUNTER — APPOINTMENT (OUTPATIENT)
Dept: GENERAL RADIOLOGY | Facility: HOSPITAL | Age: 88
End: 2023-07-23
Payer: MEDICARE

## 2023-07-23 VITALS
HEIGHT: 69 IN | HEART RATE: 56 BPM | OXYGEN SATURATION: 95 % | DIASTOLIC BLOOD PRESSURE: 81 MMHG | SYSTOLIC BLOOD PRESSURE: 151 MMHG | TEMPERATURE: 97.2 F | BODY MASS INDEX: 23.85 KG/M2 | WEIGHT: 161 LBS | RESPIRATION RATE: 16 BRPM

## 2023-07-23 DIAGNOSIS — M79.662 PAIN IN LEFT LOWER LEG: Primary | ICD-10-CM

## 2023-07-23 LAB
BH CV LOWER VASCULAR LEFT COMMON FEMORAL AUGMENT: NORMAL
BH CV LOWER VASCULAR LEFT COMMON FEMORAL COMPETENT: NORMAL
BH CV LOWER VASCULAR LEFT COMMON FEMORAL COMPRESS: NORMAL
BH CV LOWER VASCULAR LEFT COMMON FEMORAL PHASIC: NORMAL
BH CV LOWER VASCULAR LEFT COMMON FEMORAL SPONT: NORMAL
BH CV LOWER VASCULAR LEFT DISTAL FEMORAL COMPRESS: NORMAL
BH CV LOWER VASCULAR LEFT GASTRONEMIUS COMPRESS: NORMAL
BH CV LOWER VASCULAR LEFT GREATER SAPH AK COMPRESS: NORMAL
BH CV LOWER VASCULAR LEFT GREATER SAPH BK COMPRESS: NORMAL
BH CV LOWER VASCULAR LEFT LESSER SAPH COMPRESS: NORMAL
BH CV LOWER VASCULAR LEFT MID FEMORAL AUGMENT: NORMAL
BH CV LOWER VASCULAR LEFT MID FEMORAL COMPETENT: NORMAL
BH CV LOWER VASCULAR LEFT MID FEMORAL COMPRESS: NORMAL
BH CV LOWER VASCULAR LEFT MID FEMORAL PHASIC: NORMAL
BH CV LOWER VASCULAR LEFT MID FEMORAL SPONT: NORMAL
BH CV LOWER VASCULAR LEFT PERONEAL COMPRESS: NORMAL
BH CV LOWER VASCULAR LEFT POPLITEAL AUGMENT: NORMAL
BH CV LOWER VASCULAR LEFT POPLITEAL COMPETENT: NORMAL
BH CV LOWER VASCULAR LEFT POPLITEAL COMPRESS: NORMAL
BH CV LOWER VASCULAR LEFT POPLITEAL PHASIC: NORMAL
BH CV LOWER VASCULAR LEFT POPLITEAL SPONT: NORMAL
BH CV LOWER VASCULAR LEFT POSTERIOR TIBIAL COMPRESS: NORMAL
BH CV LOWER VASCULAR LEFT PROFUNDA FEMORAL COMPRESS: NORMAL
BH CV LOWER VASCULAR LEFT PROXIMAL FEMORAL COMPRESS: NORMAL
BH CV LOWER VASCULAR LEFT SAPHENOFEMORAL JUNCTION COMPRESS: NORMAL
BH CV VAS PRELIMINARY FINDINGS SCRIPTING: 1

## 2023-07-23 PROCEDURE — 99284 EMERGENCY DEPT VISIT MOD MDM: CPT

## 2023-07-23 PROCEDURE — 93971 EXTREMITY STUDY: CPT

## 2023-07-23 PROCEDURE — 73560 X-RAY EXAM OF KNEE 1 OR 2: CPT

## 2023-07-23 NOTE — ED NOTES
"Pt sattes she was at home when she attempted to stand she she had  sharp pain In her L calf and knee that was \"so intense that it made me dizzy\". Ptr denies dizziness currently.   Pt has tenderness on palpation to her antererior calf and posterior knee . No obvious swelling or discoloration.   Distal pulses palpable.   "

## 2023-07-23 NOTE — ED NOTES
"Pt ambulates with a steady gate, pts states she has some pain in her L leg but \"not like before.\"   Provider made aware   "

## 2023-07-23 NOTE — ED PROVIDER NOTES
EMERGENCY DEPARTMENT ENCOUNTER    Room Number:  08/08  Date of encounter:  7/23/2023  PCP: Tara Russell MD  Patient Care Team:  Tara Russell MD as PCP - General (Geriatric Medicine)   Independent Historians: Patient    HPI:  Chief Complaint: Left leg pain  A complete HPI/ROS/PMH/PSH/SH/FH are unobtainable due to: None    Chronic or social conditions impacting patient care (social determinants of health): None    Context: Madina Coughlin is a 88 y.o. female who presents to the ED c/o a sharp pain just below her left knee that occurred while walking and shot down into her left foot.  She states the pain almost caused her to fall.  She states it is mostly resolved at this time.  She has not noticed any swelling or erythema to the leg.  No history of DVT or PE.  No chest pain or shortness of breath.  She does report having COVID-19 about 2 weeks ago and is still having some fatigue but is overall improving.  She was also recently treated for a UTI and denies current urinary symptoms.  She has previously had her left knee replaced.    Review of prior external notes (non-ED): General surgery encounter with FELIPE Victor on 5/10/2023.  Seen in follow-up for concern of left breast mass.  No changes noted since prior exam.  Recommended annual screening mammogram.    Review of prior external test results outside of this encounter: Labs on 5/27/2021 show normal CBC and normal BMP.    PAST MEDICAL HISTORY  Active Ambulatory Problems     Diagnosis Date Noted    HTN (hypertension) 05/01/2013    Hypothyroid 05/01/2013    IBS (irritable bowel syndrome) 05/01/2013    SVT (supraventricular tachycardia) 05/01/2013    Arthritis of left knee 04/28/2021    Mass of upper outer quadrant of left breast 10/11/2022     Resolved Ambulatory Problems     Diagnosis Date Noted    No Resolved Ambulatory Problems     Past Medical History:   Diagnosis Date    Abnormal heart rate     Allergic rhinitis     Arthritis     Disease of thyroid gland      History of jock itch     Hypertension     Knee pain     Mild dietary indigestion        The patient has started, but not completed, their COVID-19 vaccination series.    PAST SURGICAL HISTORY  Past Surgical History:   Procedure Laterality Date    CATARACT EXTRACTION EXTRACAPSULAR W/ INTRAOCULAR LENS IMPLANTATION Bilateral     COLONOSCOPY N/A 2017    MASTOID SURGERY      as a child    TONSILLECTOMY Bilateral     TOTAL KNEE ARTHROPLASTY Left 06/07/2021    Procedure: LEFT TOTAL KNEE ARTHROPLASTY WITH CORINNE NAVIGATION;  Surgeon: Dinesh Rahman MD;  Location: [facility];  Service: Orthopedics;  Laterality: Left;    US GUIDED CYST ASPIRATION BREAST N/A 09/07/2022    Left breast         FAMILY HISTORY  Family History   Problem Relation Age of Onset    Lymphoma Brother     Coronary artery disease Paternal Grandfather     Coronary artery disease Maternal Grandfather     Breast cancer Maternal Aunt     Uterine cancer Maternal Aunt     Ovarian cancer Neg Hx     Colon cancer Neg Hx     Malig Hyperthermia Neg Hx          SOCIAL HISTORY  Social History     Socioeconomic History    Marital status:    Tobacco Use    Smoking status: Never     Passive exposure: Never    Smokeless tobacco: Never   Vaping Use    Vaping Use: Never used   Substance and Sexual Activity    Alcohol use: Yes     Comment: couple yearly    Drug use: Never    Sexual activity: Defer         ALLERGIES  Patient has no known allergies.        REVIEW OF SYSTEMS  Review of Systems   Respiratory:  Negative for shortness of breath.    Cardiovascular:  Negative for chest pain and leg swelling.   Musculoskeletal:  Negative for back pain and neck pain.        Left leg pain   Skin:  Negative for color change.      All systems reviewed and negative except for those discussed in HPI.       PHYSICAL EXAM    I have reviewed the triage vital signs and nursing notes.    ED Triage Vitals   Temp Heart Rate Resp BP SpO2   07/23/23 1321 07/23/23 1320 07/23/23 1320  07/23/23 1320 07/23/23 1320   97.2 °F (36.2 °C) 56 16 149/71 97 %      Temp src Heart Rate Source Patient Position BP Location FiO2 (%)   07/23/23 1321 07/23/23 1320 -- -- --   Tympanic Monitor          Physical Exam  GENERAL: alert, no acute distress  SKIN: Warm, dry  HENT: Normocephalic, atraumatic  EYES: no scleral icterus  CV: regular rhythm, regular rate  RESPIRATORY: normal effort, lungs clear  ABDOMEN: soft, nontender, nondistended  MUSCULOSKELETAL: no deformity, no bony tenderness, no ligament laxity, no overlying erythema, healed surgical incision to anterior aspect of knee, intact distal pulses  NEURO: alert, moves all extremities, follows commands          LAB RESULTS  No results found for this or any previous visit (from the past 24 hour(s)).    Ordered the above labs and independently reviewed and interpreted the results.        RADIOLOGY  XR Knee 1 or 2 View Left   Final Result   FINDINGS: The patient has previous total knee replacement and the  alignment appears satisfactory and unchanged from 06/07/2021. There may  be some slight generalized joint space narrowing on today's exam. There  is no joint effusion.     US LLE  Study Impression       Right Common Femoral:  No deep vein thrombosis noted.          Left Common Femoral: No deep vein thrombosis noted.        Left Saphenofemoral Junction: No superficial thrombophlebitis noted.        Left Proximal Femoral: No deep vein thrombosis noted.        Left Mid Femoral: No deep vein thrombosis noted.        Left Distal Femoral: No deep vein thrombosis noted.        Left Popliteal: No deep vein thrombosis noted.        Left Posterior Tibial: No deep vein thrombosis noted.        Left Peroneal: No deep vein thrombosis noted.        Left Gastrocnemius: No deep vein thrombosis noted.         Left Great Saphenous Above Knee: No superficial thrombophlebitis noted.        Left Great Saphenous Below Knee: No superficial thrombophlebitis noted.     I ordered the  above noted radiological studies. Independently reviewed and interpreted by me.  See dictation for official radiology interpretation.      PROCEDURES    Procedures      MEDICATIONS GIVEN IN ER    Medications - No data to display      PROGRESS, DATA ANALYSIS, CONSULTS, AND MEDICAL DECISION MAKING    All labs have been independently reviewed and interpreted by me.  All radiology studies have been independently reviewed and interpreted by me and discussed with radiologist dictating the report.   EKG's independently reviewed and interpreted by me.  Discussion below represents my analysis of pertinent findings related to patient's condition, differential diagnosis, treatment plan and final disposition.    Differential diagnosis: fracture, contusion, sprain, ligamentous injury, DVT, muscle strain/tear    ED Course as of 07/25/23 0042   Sun Jul 23, 2023   1500 XR Knee 1 or 2 View Left  Radiology study independently interpreted by me and my findings are no fracture.   [DC]   1506 Discussed case with Daniela from vascular who reports preliminary reading for US LLE is negative for DVT. [DC]   1529 Patient ambulated with minimal pain. [DC]      ED Course User Index  [DC] Paula Cheng, PA           PPE: Patient was placed in face mask in first look. Patient was wearing facemask when I entered the room and throughout our encounter. I wore full protective equipment throughout this patient encounter including a face mask, and gloves. Hand hygiene was performed before donning protective equipment and after removal when leaving the room.        AS OF 00:42 EDT VITALS:    BP - 151/81  HR - 56  TEMP - 97.2 °F (36.2 °C) (Tympanic)  O2 SATS - 95%        DIAGNOSIS  Final diagnoses:   Pain in left lower leg         DISPOSITION  ED Disposition       ED Disposition   Discharge    Condition   Stable    Comment   --                  Note Disclaimer: At [facility], we believe that sharing information builds trust and better relationships.  You are receiving this note because you recently [facility]. It is possible you will see health information before a provider has talked with you about it. This kind of information can be easy to misunderstand. To help you fully understand what it means for your health, we urge you to discuss this note with your provider.         Paula Cheng PA  07/25/23 0042

## 2023-07-23 NOTE — ED PROVIDER NOTES
MD ATTESTATION NOTE    The [facility] and I have discussed this patient's history, physical exam, and treatment plan.    I provided a substantive portion of the care of this patient. I personally performed the physical exam, in its entirety. The attached note describes my personal findings.      Madina Coughlin is a 88 y.o. female who presents to the ED c/o left leg pain that started while she was walking about an hour prior to arrival.  No preceding trauma.  No fevers last couple days although she recently recovered from COVID-19 infection about 2 weeks ago.  She states that her symptoms are overall improved.      On exam:  GENERAL: not distressed  HENT: nares patent  EYES: no scleral icterus  CV: regular rhythm, regular rate  RESPIRATORY: normal effort  ABDOMEN: soft  MUSCULOSKELETAL: no deformity, intact left knee extension, well-healed surgical incision anteriorly to the left knee, no focal bony tenderness on exam, no edema  NEURO: alert, moves all extremities, follows commands  SKIN: warm, dry, no overlying redness or warmth to her left leg    Labs  No results found for this or any previous visit (from the past 24 hour(s)).    Radiology  No Radiology Exams Resulted Within Past 24 Hours    Medications given in the ED:  Medications - No data to display    Orders placed during this visit:  Orders Placed This Encounter   Procedures    XR Knee 1 or 2 View Left       Medical Decision Making:  ED Course as of 07/26/23 1417   Sun Jul 23, 2023   1500 XR Knee 1 or 2 View Left  Radiology study independently interpreted by me and my findings are no fracture.   [DC]   1506 Discussed case with Daniela from vascular who reports preliminary reading for US LLE is negative for DVT. [DC]   1529 Patient ambulated with minimal pain. [DC]      ED Course User Index  [DC] Paula Cheng PA             Diagnosis  Final diagnoses:   Pain in left lower leg          Bobby Weems II, MD  07/26/23 1417

## 2023-07-23 NOTE — ED NOTES
Left leg pain started 1000.  She took a step on it and experienced sharp pain in lower leg to knee.  She then had an episode of dizziness.  She c/o weakness.  She was positive covid 2 weeks ago

## 2023-08-07 ENCOUNTER — OFFICE VISIT (OUTPATIENT)
Dept: ORTHOPEDIC SURGERY | Facility: CLINIC | Age: 88
End: 2023-08-07
Payer: MEDICARE

## 2023-08-07 VITALS — TEMPERATURE: 97.4 F | BODY MASS INDEX: 23.7 KG/M2 | WEIGHT: 160 LBS | HEIGHT: 69 IN

## 2023-08-07 DIAGNOSIS — M25.572 LEFT ANKLE PAIN, UNSPECIFIED CHRONICITY: Primary | ICD-10-CM

## 2023-08-07 RX ORDER — NITROFURANTOIN 25; 75 MG/1; MG/1
CAPSULE ORAL
COMMUNITY
Start: 2023-07-14

## 2023-08-07 RX ORDER — LISINOPRIL 20 MG/1
TABLET ORAL
COMMUNITY
Start: 2023-07-14

## 2023-08-07 RX ORDER — CIPROFLOXACIN 500 MG/1
TABLET, FILM COATED ORAL
COMMUNITY
Start: 2023-07-17

## 2023-08-08 NOTE — PROGRESS NOTES
General Exam    Patient: Madina Coughlin    YOB: 1935    Medical Record Number: [id number]    Chief Complaints: Left ankle pain    History of Present Illness:     88 y.o. female patient who presents for evaluation of left ankle pain.  Patient states 2 weeks ago she had some significant pain when walking along the anterior aspect of her ankle.  It subsided.  This was an isolated event.  Since then has been doing fine with no issues.  Denies any symptoms today.    Denies any numbness or tingling.  Denies any fevers, cough or shortness of breath.    Allergies: No Known Allergies    Home Medications:      Current Outpatient Medications:     azelastine (ASTELIN) 0.1 % nasal spray, 2 sprays into the nostril(s) as directed by provider 2 (Two) Times a Day. Use in each nostril as directed, Disp: , Rfl:     Digestive Enzymes (DIGESTIVE ENZYME PO), Take 2 each by mouth 2 (Two) Times a Day., Disp: , Rfl:     levocetirizine (XYZAL) 5 MG tablet, Take 1 tablet by mouth Every Evening., Disp: , Rfl:     levothyroxine (SYNTHROID, LEVOTHROID) 25 MCG tablet, Take 1 tablet by mouth 2 (Two) Times a Day., Disp: , Rfl:     liothyronine (CYTOMEL) 5 MCG tablet, Take 1.5 tablets by mouth Every Night., Disp: , Rfl:     lisinopril (PRINIVIL,ZESTRIL) 20 MG tablet, , Disp: , Rfl:     melatonin 3 MG tablet, Take 1 tablet by mouth At Night As Needed for Sleep., Disp: , Rfl:     metoprolol succinate XL (TOPROL-XL) 50 MG 24 hr tablet, Take 1 tablet by mouth Every Night., Disp: , Rfl:     multivitamin with minerals tablet tablet, Take 1 tablet by mouth Daily., Disp: , Rfl:     Pregnenolone Micronized (PREGNENOLONE PO), Take 10 mg by mouth Daily., Disp: , Rfl:     Probiotic Product (PROBIOTIC PO), Take 1 capsule by mouth Daily., Disp: , Rfl:     ciprofloxacin (CIPRO) 500 MG tablet, , Disp: , Rfl:     clobetasol (TEMOVATE) 0.05 % ointment, Apply 1 application topically to the appropriate area as directed 2 (Two) Times a Day. For up to 2  weeks (Patient not taking: Reported on 8/7/2023), Disp: 15 g, Rfl: 1    lisinopril (PRINIVIL,ZESTRIL) 10 MG tablet, Take 1 tablet by mouth Daily. (Patient not taking: Reported on 8/7/2023), Disp: , Rfl:     nitrofurantoin, macrocrystal-monohydrate, (MACROBID) 100 MG capsule, , Disp: , Rfl:   No current facility-administered medications for this visit.    Facility-Administered Medications Ordered in Other Visits:     Chlorhexidine Gluconate Cloth 2 % pads 1 each, 1 each, Apply externally, Take As Directed, Dinesh Rahman MD    Past Medical History:   Diagnosis Date    Abnormal heart rate     SVT    Allergic rhinitis     Arthritis     Disease of thyroid gland     History of jock itch     uses clotrimazole cream    Hypertension     Knee pain     Mild dietary indigestion     TAKES DIGESTIVE ENZYMES AND IF SHE DOESN'T HAS A LOT OF GAS AND BLOATING        Past Surgical History:   Procedure Laterality Date    CATARACT EXTRACTION EXTRACAPSULAR W/ INTRAOCULAR LENS IMPLANTATION Bilateral     COLONOSCOPY N/A 2017    MASTOID SURGERY      as a child    TONSILLECTOMY Bilateral     TOTAL KNEE ARTHROPLASTY Left 06/07/2021    Procedure: LEFT TOTAL KNEE ARTHROPLASTY WITH CORINNE NAVIGATION;  Surgeon: Dinesh Rahman MD;  Location: [facility];  Service: Orthopedics;  Laterality: Left;    US GUIDED CYST ASPIRATION BREAST N/A 09/07/2022    Left breast       Social History     Occupational History    Not on file   Tobacco Use    Smoking status: Never     Passive exposure: Never    Smokeless tobacco: Never   Vaping Use    Vaping Use: Never used   Substance and Sexual Activity    Alcohol use: Yes     Comment: couple yearly    Drug use: Never    Sexual activity: Defer      Social History     Social History Narrative    Not on file       Family History   Problem Relation Age of Onset    Lymphoma Brother     Coronary artery disease Paternal Grandfather     Coronary artery disease Maternal Grandfather     Breast cancer Maternal Aunt      "Uterine cancer Maternal Aunt     Ovarian cancer Neg Hx     Colon cancer Neg Hx     Malig Hyperthermia Neg Hx        Review of Systems:      Constitutional: Denies fever, shaking or chills         All other pertinent positives and negatives as noted above in HPI.    Physical Exam: 88 y.o. female    Vitals:    08/07/23 1358   Temp: 97.4 øF (36.3 øC)   TempSrc: Temporal   Weight: 72.6 kg (160 lb)   Height: 174 cm (68.5\")       General:  Patient is awake and alert.  Appears in no acute distress or discomfort.      Musculoskeletal/Extremities:    Negative findings             Assessment: Left ankle pain      Plan:      Patient is asymptomatic today and been doing well.  She did have an ultrasound done in emergency department which was negative.  Does not have any issues this seems to be an isolated event.  This time recommend progressing back into normal activity can always try some anti-inflammatory medication if can take and tolerated plus or minus activity modification rest ice Tylenol for pain.  If symptoms recur she is instructed to return for evaluation.           We will plan for follow up as above.    All questions were answered.  Patient understands and agrees with the plan.    Bobby Calvillo MD    08/07/2023    CC to Tara Russell MD        "

## 2023-08-28 ENCOUNTER — TELEPHONE (OUTPATIENT)
Dept: SURGERY | Facility: CLINIC | Age: 88
End: 2023-08-28
Payer: MEDICARE

## 2023-08-28 NOTE — TELEPHONE ENCOUNTER
----- Message from Paula Victor PA-C sent at 8/25/2023 10:25 AM EDT -----  Regarding: records  Hi,     Can you see if she has had her annual mammogram, yet? She is due in 08/2023. She goes to [facility]. If so, please request report and images and let me know when they are available.     Thanks,  Paula

## 2023-09-25 ENCOUNTER — TELEPHONE (OUTPATIENT)
Dept: SURGERY | Facility: CLINIC | Age: 88
End: 2023-09-25

## 2023-09-25 NOTE — TELEPHONE ENCOUNTER
Called and lvm for patient again (see telephone enc from 8/28/23) to see if she had her screening mammo done this year that was due in August 2023. Would contact patient's son, but he is not listed on verbal release.

## 2023-09-26 NOTE — TELEPHONE ENCOUNTER
PT CALLED BACK AND ADVISED SHE IS HAVING MAMMO THE FIRST WEEK OF OCTOBER (SHE COULDN'T  REMEMBER THE EXACT DATE) AT [facility] .

## 2023-10-10 ENCOUNTER — APPOINTMENT (OUTPATIENT)
Dept: WOMENS IMAGING | Facility: HOSPITAL | Age: 88
End: 2023-10-10
Payer: MEDICARE

## 2023-10-10 PROCEDURE — 77067 SCR MAMMO BI INCL CAD: CPT | Performed by: RADIOLOGY

## 2023-10-10 PROCEDURE — 77063 BREAST TOMOSYNTHESIS BI: CPT | Performed by: RADIOLOGY

## 2023-10-12 ENCOUNTER — TELEPHONE (OUTPATIENT)
Dept: SURGERY | Facility: CLINIC | Age: 88
End: 2023-10-12
Payer: MEDICARE

## 2023-10-12 NOTE — TELEPHONE ENCOUNTER
"Relay     \"Unable to reach patient. Left voicemail requesting a return call. Call was to let her know that her recent mammogram was normal. Recommend repeat mammogram in one year (due 10/2024) and to see if she would like to schedule an appointment for a breast exam.\"                "

## 2023-10-12 NOTE — TELEPHONE ENCOUNTER
----- Message from Paula Victor PA-C sent at 10/12/2023  3:19 PM EDT -----  Regarding: results  Hi,     Please call her and let her know that her recent mammogram was normal. Recommend repeat mammogram in one year (due 10/2024).    Please see if she would like to come back into the office for a breast exam, as we had discussed at her last appointment.     Thanks,  Paula  ---    10/10/2023 Bilateral Screening Mammogram with Tomosynthesis and CAD:  There are scattered areas of fibroglandular density.  No suspicious masses, significant calcifications or other abnormalities are seen.  Impression:  There is no mammographic evidence of malignancy.  Screening mammogram in 1 year is recommended.  BI-RADS Category 1: Negative

## 2023-10-12 NOTE — TELEPHONE ENCOUNTER
Name: AMADOR ROLLINS  Relationship: SELF  Best Callback Number: [phone]   [facility]UB PROVIDED THE RELAY MESSAGE FROM THE OFFICE   PATIENT VOICED UNDERSTANDING AND HAS NO FURTHER QUESTIONS AT THIS TIME  ADDITIONAL INFORMATION:

## 2024-09-30 ENCOUNTER — TELEPHONE (OUTPATIENT)
Dept: ORTHOPEDIC SURGERY | Facility: CLINIC | Age: 89
End: 2024-09-30

## 2024-09-30 NOTE — TELEPHONE ENCOUNTER
[facility] staff attempted to follow warm transfer process and was unsuccessful     Caller: Madina Coughlin    Relationship to patient: Self    Best call back number: [phone]    Patient is needing: PT IS SCHEDULED FOR LEFT KNEE PAIN - 2 WEEKS AGO SAT IN CHAIR, HEARD POP FOR 10/14/24, ASKING FOR SOONER APPT. PT STATES WE CAN LVM IF SHE DOESN'T ANSWER.

## 2024-10-01 ENCOUNTER — OFFICE VISIT (OUTPATIENT)
Dept: ORTHOPEDIC SURGERY | Facility: CLINIC | Age: 89
End: 2024-10-01
Payer: MEDICARE

## 2024-10-01 VITALS — HEIGHT: 69 IN | WEIGHT: 160 LBS | TEMPERATURE: 98.6 F | BODY MASS INDEX: 23.7 KG/M2

## 2024-10-01 DIAGNOSIS — M25.562 LEFT KNEE PAIN, UNSPECIFIED CHRONICITY: Primary | ICD-10-CM

## 2024-10-01 DIAGNOSIS — Z96.652 HISTORY OF TOTAL KNEE ARTHROPLASTY, LEFT: ICD-10-CM

## 2024-10-01 PROCEDURE — 99214 OFFICE O/P EST MOD 30 MIN: CPT | Performed by: ORTHOPAEDIC SURGERY

## 2024-10-01 PROCEDURE — 72170 X-RAY EXAM OF PELVIS: CPT | Performed by: ORTHOPAEDIC SURGERY

## 2024-10-01 PROCEDURE — 1159F MED LIST DOCD IN RCRD: CPT | Performed by: ORTHOPAEDIC SURGERY

## 2024-10-01 PROCEDURE — 1160F RVW MEDS BY RX/DR IN RCRD: CPT | Performed by: ORTHOPAEDIC SURGERY

## 2024-10-01 PROCEDURE — 73562 X-RAY EXAM OF KNEE 3: CPT | Performed by: ORTHOPAEDIC SURGERY

## 2024-10-01 RX ORDER — LIOTHYRONINE SODIUM 5 UG/1
15 TABLET ORAL DAILY
COMMUNITY

## 2024-10-01 NOTE — PROGRESS NOTES
General Exam    Patient: Madina Coughlin    YOB: 1935    Medical Record Number: [id number]    Chief Complaints: Left knee pain    History of Present Illness:     89 y.o. female patient who presents for evaluation left knee pain.  Patient states about 2 weeks ago she was sitting down in a low chair felt a pop had some pain discomfort.  States however this past 2 weeks her symptoms are better she is just a little worrisome about increasing her activity.  Does have a history of total knee replacement done  3 years ago.    Denies any numbness or tingling.  Denies any fevers, cough or shortness of breath.    Allergies: No Known Allergies    Home Medications:      Current Outpatient Medications:     apixaban (ELIQUIS) 5 MG tablet tablet, Take 1 tablet by mouth., Disp: , Rfl:     azelastine (ASTELIN) 0.1 % nasal spray, Administer 2 sprays into the nostril(s) as directed by provider 2 (Two) Times a Day. Use in each nostril as directed, Disp: , Rfl:     Digestive Enzymes (DIGESTIVE ENZYME PO), Take 2 each by mouth 2 (Two) Times a Day., Disp: , Rfl:     levocetirizine (XYZAL) 5 MG tablet, Take 1 tablet by mouth Every Evening., Disp: , Rfl:     levothyroxine (SYNTHROID, LEVOTHROID) 25 MCG tablet, Take 1 tablet by mouth 2 (Two) Times a Day., Disp: , Rfl:     liothyronine (CYTOMEL) 5 MCG tablet, Take 3 tablets by mouth Daily., Disp: , Rfl:     lisinopril (PRINIVIL,ZESTRIL) 20 MG tablet, , Disp: , Rfl:     melatonin 3 MG tablet, Take 1 tablet by mouth At Night As Needed for Sleep., Disp: , Rfl:     metoprolol succinate XL (TOPROL-XL) 50 MG 24 hr tablet, Take 1 tablet by mouth Every Night., Disp: , Rfl:     multivitamin with minerals tablet tablet, Take 1 tablet by mouth Daily., Disp: , Rfl:     Pregnenolone Micronized (PREGNENOLONE PO), Take 10 mg by mouth Daily., Disp: , Rfl:     Probiotic Product (PROBIOTIC PO), Take 1 capsule by mouth Daily., Disp: , Rfl:   No current facility-administered medications for this  visit.    Facility-Administered Medications Ordered in Other Visits:     Chlorhexidine Gluconate Cloth 2 % pads 1 each, 1 each, Apply externally, Take As Directed, Dinesh Rahman MD    Past Medical History:   Diagnosis Date    Abnormal heart rate     SVT    Allergic rhinitis     Arthritis     Disease of thyroid gland     History of jock itch     uses clotrimazole cream    Hypertension     Knee pain     Mild dietary indigestion     TAKES DIGESTIVE ENZYMES AND IF SHE DOESN'T HAS A LOT OF GAS AND BLOATING        Past Surgical History:   Procedure Laterality Date    CATARACT EXTRACTION EXTRACAPSULAR W/ INTRAOCULAR LENS IMPLANTATION Bilateral     COLONOSCOPY N/A 2017    MASTOID SURGERY      as a child    TONSILLECTOMY Bilateral     TOTAL KNEE ARTHROPLASTY Left 06/07/2021    Procedure: LEFT TOTAL KNEE ARTHROPLASTY WITH CORINNE NAVIGATION;  Surgeon: Dinesh Rahman MD;  Location: [facility];  Service: Orthopedics;  Laterality: Left;    US GUIDED CYST ASPIRATION BREAST N/A 09/07/2022    Left breast       Social History     Occupational History    Not on file   Tobacco Use    Smoking status: Never     Passive exposure: Never    Smokeless tobacco: Never   Vaping Use    Vaping status: Never Used   Substance and Sexual Activity    Alcohol use: Yes     Comment: couple yearly    Drug use: Never    Sexual activity: Defer      Social History     Social History Narrative    Not on file       Family History   Problem Relation Age of Onset    Lymphoma Brother     Coronary artery disease Paternal Grandfather     Coronary artery disease Maternal Grandfather     Breast cancer Maternal Aunt     Uterine cancer Maternal Aunt     Ovarian cancer Neg Hx     Colon cancer Neg Hx     Malig Hyperthermia Neg Hx        Review of Systems:      Constitutional: Denies fever, shaking or chills         All other pertinent positives and negatives as noted above in HPI.    Physical Exam: 89 y.o. female    Vitals:    10/01/24 1533   Temp: 98.6 °F (37  "°C)   Weight: 72.6 kg (160 lb)   Height: 174 cm (68.5\")       General:  Patient is awake and alert.  Appears in no acute distress or discomfort.      Musculoskeletal/Extremities:    Left knee exam incision intact.  Full painless knee range of motion no tenderness palpation.  Knee is stable varus valgus stress.  No flexion instability noted.         Radiology:       3 views left knee AP, lateral and sunrise as well as AP pelvis taken reviewed to evaluate the patient's complaint/s.    Knee films show status post total knee implants.  In satisfactory position no evidence of loosening or subsidence no significant change from previous films.  Hip films show mild to moderate degenerative changes bilateral hip joints with some early bone sclerosis noted.       No imaging for comparison.    Assessment: Left knee pain      Plan:      Discussed the findings with the patient mechanically implants.  In stable position no significant changes from previous films.  On exam no significant findings.  I feel that this may have just been an event that stirred up some soft tissues and we are improving however I do think the patient would benefit from some physical therapy.  We will order that as she is a resident at [place].  They can work on just strengthening and gait training and increase mobility.  Recommend ice as needed plus mise Tylenol for pain.  Okay for anti-inflammatory if able to take and tolerate.  Follow-up as needed.           We will plan for follow up as above.    All questions were answered.  Patient understands and agrees with the plan.    Bobby Calvillo MD    10/01/2024    CC to Tara Russell MD       "

## 2024-11-26 ENCOUNTER — TELEPHONE (OUTPATIENT)
Dept: ORTHOPEDIC SURGERY | Facility: CLINIC | Age: 89
End: 2024-11-26
Payer: MEDICARE

## 2024-11-26 NOTE — TELEPHONE ENCOUNTER
Received a call from [place], they want to confirm that our office received patients discharge summary from physical therapy.

## 2024-11-26 NOTE — TELEPHONE ENCOUNTER
Discharge report was received, signed and faxed back to [facility]...ATTN:Cameron Izaguirre.

## (undated) DEVICE — STERILE PATIENT PROTECTIVE PAD FOR IMP® KNEE POSITIONERS & COHESIVE WRAP (10 / CASE): Brand: DE MAYO KNEE POSITIONER®

## (undated) DEVICE — BNDG ELAS ELITE V/CLOSE 6IN 5YD LF STRL

## (undated) DEVICE — GLV SURG SENSICARE POLYISPRN W/ALOE PF LF 9 GRN STRL

## (undated) DEVICE — PREMIUM WET SKIN PREP TRAY: Brand: MEDLINE INDUSTRIES, INC.

## (undated) DEVICE — GLV SURG SENSICARE W/ALOE PF LF 9 STRL

## (undated) DEVICE — GAUZE,SPONGE,FLUFF,6"X6.75",STRL,10/TRAY: Brand: MEDLINE

## (undated) DEVICE — PK KN TOTL 40

## (undated) DEVICE — PENCL E/S ULTRAVAC TELESCP NOSE HOLSTR 10FT

## (undated) DEVICE — DUAL CUT SAGITTAL BLADE

## (undated) DEVICE — OPTIFOAM GENTLE SA, POSTOP, 4X12: Brand: MEDLINE

## (undated) DEVICE — 3M™ IOBAN™ 2 ANTIMICROBIAL INCISE DRAPE 6650EZ: Brand: IOBAN™ 2

## (undated) DEVICE — TRAP FLD MINIVAC MEGADYNE 100ML

## (undated) DEVICE — MAT FLR ABSORBENT LG 4FT 10 2.5FT

## (undated) DEVICE — P.F.C. DRILL BIT AND STEINMAN PIN PACKET (1 UNIT) .125IN DIA 5IN LGTH: Brand: P.F.C.

## (undated) DEVICE — APPL CHLORAPREP HI/LITE 26ML ORNG

## (undated) DEVICE — SYR LUERLOK 30CC

## (undated) DEVICE — DECANT BG O JET

## (undated) DEVICE — INSTRUMENT BATTERY

## (undated) DEVICE — SUT VICRYL 1 CT1 27IN  JJ40G

## (undated) DEVICE — NEEDLE, QUINCKE, 18GX3.5": Brand: MEDLINE

## (undated) DEVICE — SYS CLS SKIN PREMIERPRO EXOFINFUSION 22CM

## (undated) DEVICE — GLV SURG PREMIERPRO ORTHO LTX PF SZ8.5 BRN

## (undated) DEVICE — COVER,MAYO STAND,STERILE: Brand: MEDLINE

## (undated) DEVICE — PREP SOL POVIDONE/IODINE BT 4OZ

## (undated) DEVICE — UNDERCAST PADDING: Brand: DEROYAL